# Patient Record
Sex: MALE | Race: WHITE | NOT HISPANIC OR LATINO | Employment: UNEMPLOYED | URBAN - METROPOLITAN AREA
[De-identification: names, ages, dates, MRNs, and addresses within clinical notes are randomized per-mention and may not be internally consistent; named-entity substitution may affect disease eponyms.]

---
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
## 2020-01-09 ENCOUNTER — PATIENT MESSAGE (OUTPATIENT)
Dept: PEDIATRIC ENDOCRINOLOGY | Facility: CLINIC | Age: 20
End: 2020-01-09

## 2020-01-09 DIAGNOSIS — E10.9 TYPE 1 DIABETES MELLITUS WITHOUT COMPLICATIONS: ICD-10-CM

## 2020-01-09 RX ORDER — METFORMIN HYDROCHLORIDE 1000 MG/1
TABLET ORAL
Qty: 60 TABLET | Refills: 0 | Status: SHIPPED | OUTPATIENT
Start: 2020-01-09 | End: 2020-04-21 | Stop reason: SDUPTHER

## 2020-01-29 DIAGNOSIS — E10.9 TYPE 1 DIABETES MELLITUS WITHOUT COMPLICATIONS: ICD-10-CM

## 2020-01-29 RX ORDER — PEN NEEDLE, DIABETIC 30 GX3/16"
NEEDLE, DISPOSABLE MISCELLANEOUS
Qty: 200 EACH | Refills: 3 | Status: SHIPPED | OUTPATIENT
Start: 2020-01-29 | End: 2020-04-21 | Stop reason: SDUPTHER

## 2020-03-12 DIAGNOSIS — E10.9 TYPE 1 DIABETES MELLITUS WITHOUT COMPLICATIONS: ICD-10-CM

## 2020-03-13 RX ORDER — INSULIN LISPRO 100 [IU]/ML
INJECTION, SOLUTION INTRAVENOUS; SUBCUTANEOUS
Qty: 15 ML | Refills: 0 | Status: SHIPPED | OUTPATIENT
Start: 2020-03-13 | End: 2020-04-14

## 2020-03-16 ENCOUNTER — TELEPHONE (OUTPATIENT)
Dept: PEDIATRIC ENDOCRINOLOGY | Facility: CLINIC | Age: 20
End: 2020-03-16

## 2020-03-16 NOTE — TELEPHONE ENCOUNTER
Returned mom's call stating she is returning the call to Katiana Tanner.  Mom informed Katiana in clinic seeing patients and message will be forwarded.  Mom verbalized understanding.    ----- Message from Beatriz Valdez sent at 3/16/2020  2:26 PM CDT -----  Type:  Needs Medical Advice    Who Called: MOM     Would the patient rather a call back or a response via [facility]? CALL BACK     Best Call Back Number: [phone]    Additional Information: RETURNING MISSED CALL

## 2020-03-17 DIAGNOSIS — E10.9 TYPE 1 DIABETES MELLITUS WITHOUT COMPLICATIONS: ICD-10-CM

## 2020-03-17 RX ORDER — CALCIUM CITRATE/VITAMIN D3 200MG-6.25
TABLET ORAL
Qty: 200 STRIP | Refills: 4 | Status: SHIPPED | OUTPATIENT
Start: 2020-03-17 | End: 2020-08-13

## 2020-03-17 NOTE — TELEPHONE ENCOUNTER
Attempted to call pt to schedule peds endo f/u appt; to no avail.   Left a voice message to return my call directly.    ----- Message from Katiana Tanner NP sent at 3/16/2020  4:36 PM CDT -----  Yes, that's why I called her. He hasn't been seen since August and he needs refills. He needs to be scheduled with one of the physicians though.    ----- Message -----  From: Erin Olivia RN  Sent: 3/16/2020   2:48 PM CDT  To: Katiana Tanner NP    Mom called stating you called her and she was returning her call.  She also stated he is over due for his f/u appt.  Would it be okay to schedule?    Please advise.    Erin CALDERON RN

## 2020-04-14 ENCOUNTER — PATIENT MESSAGE (OUTPATIENT)
Dept: PEDIATRIC ENDOCRINOLOGY | Facility: CLINIC | Age: 20
End: 2020-04-14

## 2020-04-14 DIAGNOSIS — E10.9 TYPE 1 DIABETES MELLITUS WITHOUT COMPLICATIONS: ICD-10-CM

## 2020-04-14 RX ORDER — INSULIN LISPRO 100 [IU]/ML
INJECTION, SOLUTION INTRAVENOUS; SUBCUTANEOUS
Qty: 15 SYRINGE | Refills: 0 | Status: SHIPPED | OUTPATIENT
Start: 2020-04-14 | End: 2020-04-21 | Stop reason: SDUPTHER

## 2020-04-14 NOTE — TELEPHONE ENCOUNTER
Per Katiana Tanner, attempted to call pt to schedule peds endo f/u video appt; to no avail.  Left a voice message to return the call to our office to schedule.    ----- Message from Katiana Tanner NP sent at 4/14/2020  8:47 AM CDT -----  Regarding: follow up  I just received another refill request for Jack's insulin. It looks like we tried to schedule his f/u over a month ago and he's missed 2 appts with [facility] since he was last seen in August. I sent a message through the portal but not sure if he will respond. Please see if one of you can reach him to schedule a video visit with either Dr. Valdez or Dr. Puente. Thanks!

## 2020-04-17 ENCOUNTER — TELEPHONE (OUTPATIENT)
Dept: PEDIATRIC ENDOCRINOLOGY | Facility: CLINIC | Age: 20
End: 2020-04-17

## 2020-04-17 ENCOUNTER — PATIENT MESSAGE (OUTPATIENT)
Dept: PEDIATRIC ENDOCRINOLOGY | Facility: CLINIC | Age: 20
End: 2020-04-17

## 2020-04-17 NOTE — TELEPHONE ENCOUNTER
Called mom regarding glucose logs needed prior to Monday's appt.  Mom stated she will have Jack fax to our office by Monday morning.  Mom verbalized understanding.

## 2020-04-17 NOTE — TELEPHONE ENCOUNTER
Returned mom's call regarding pt's peds endo f/u appt.  Mom stated she was calling on Jack's behalf to schedule his video visit.  Mom scheduled appt for Monday (4/20) at 2p with Dr. Valdez.  Mom verified Jack has an active [employer] account and an iOS or Android cell phone or tablet with a microphone and front-facing camera with wi-fi connection.  Informed to check in 15 min prior to video visit via [employer] to begin the video visit and if any issues to contact our office prior to video visit.  Mom instructed to inform pt his height/weight and glucose levels x 2 weeks will need to be obtained prior to appt.  Mom informed instructions and blank log sheet will be emailed to patient.  Mom verbalized understanding.    ----- Message from Angie Lebron sent at 4/17/2020  2:28 PM CDT -----  Contact: [phone]  Type:  Patient Returning Call    Who CalledMOM  Who Left Message for Patient:The Provider  Does the patient know what this is regarding?:No  Would the patient rather a call back  Best Call Back NumberMOM [phone]  Additional Information: Requesting a call back

## 2020-04-20 ENCOUNTER — OFFICE VISIT (OUTPATIENT)
Dept: PEDIATRIC ENDOCRINOLOGY | Facility: CLINIC | Age: 20
End: 2020-04-20
Payer: MEDICAID

## 2020-04-20 DIAGNOSIS — E10.9 TYPE 1 DIABETES MELLITUS WITHOUT COMPLICATION: Primary | ICD-10-CM

## 2020-04-20 DIAGNOSIS — E10.9 TYPE 1 DIABETES MELLITUS WITHOUT COMPLICATIONS: ICD-10-CM

## 2020-04-20 PROCEDURE — 99499 UNLISTED E&M SERVICE: CPT | Mod: ,,, | Performed by: PEDIATRICS

## 2020-04-20 PROCEDURE — 99499 NO LOS: ICD-10-PCS | Mod: ,,, | Performed by: PEDIATRICS

## 2020-04-20 NOTE — TELEPHONE ENCOUNTER
"Called mom to confirm pt's appt.  Mom verbalized"I am at work I will call him to sign on My Chart."  Provider notified.  "

## 2020-04-20 NOTE — PROGRESS NOTES
Patient was rescheduled to tomorrow due to having no BG data available for review. Billable visit was not completed.    Tk Valdez MD  Section of Endocrinology  [facility]

## 2020-04-21 ENCOUNTER — PATIENT MESSAGE (OUTPATIENT)
Dept: PEDIATRIC ENDOCRINOLOGY | Facility: CLINIC | Age: 20
End: 2020-04-21

## 2020-04-21 ENCOUNTER — OFFICE VISIT (OUTPATIENT)
Dept: PEDIATRIC ENDOCRINOLOGY | Facility: CLINIC | Age: 20
End: 2020-04-21
Payer: MEDICAID

## 2020-04-21 DIAGNOSIS — K76.0 FATTY LIVER DISEASE, NONALCOHOLIC: ICD-10-CM

## 2020-04-21 DIAGNOSIS — E10.9 TYPE 1 DIABETES MELLITUS WITHOUT COMPLICATION: Primary | ICD-10-CM

## 2020-04-21 DIAGNOSIS — E06.3 AUTOIMMUNE HYPOTHYROIDISM: ICD-10-CM

## 2020-04-21 DIAGNOSIS — E66.09 OBESITY DUE TO EXCESS CALORIES WITH SERIOUS COMORBIDITY, UNSPECIFIED CLASSIFICATION: ICD-10-CM

## 2020-04-21 DIAGNOSIS — E03.9 HYPOTHYROIDISM (ACQUIRED): ICD-10-CM

## 2020-04-21 DIAGNOSIS — E10.9 NEW ONSET OF DIABETES MELLITUS IN PEDIATRIC PATIENT: ICD-10-CM

## 2020-04-21 DIAGNOSIS — E10.9 TYPE 1 DIABETES MELLITUS WITHOUT COMPLICATIONS: ICD-10-CM

## 2020-04-21 PROCEDURE — 99214 PR OFFICE/OUTPT VISIT, EST, LEVL IV, 30-39 MIN: ICD-10-PCS | Mod: 95,,, | Performed by: PEDIATRICS

## 2020-04-21 PROCEDURE — 99214 OFFICE O/P EST MOD 30 MIN: CPT | Mod: 95,,, | Performed by: PEDIATRICS

## 2020-04-21 RX ORDER — ISOPROPYL ALCOHOL 70 ML/100ML
1 SWAB TOPICAL
Qty: 200 EACH | Refills: 2 | Status: SHIPPED | OUTPATIENT
Start: 2020-04-21

## 2020-04-21 RX ORDER — INSULIN DEGLUDEC 100 U/ML
INJECTION, SOLUTION SUBCUTANEOUS
Qty: 15 SYRINGE | Refills: 0 | Status: SHIPPED | OUTPATIENT
Start: 2020-04-21 | End: 2020-04-21 | Stop reason: SDUPTHER

## 2020-04-21 RX ORDER — INSULIN LISPRO 100 [IU]/ML
INJECTION, SOLUTION INTRAVENOUS; SUBCUTANEOUS
Qty: 9 ML | Refills: 4 | Status: SHIPPED | OUTPATIENT
Start: 2020-04-21 | End: 2021-02-02 | Stop reason: SDUPTHER

## 2020-04-21 RX ORDER — PEN NEEDLE, DIABETIC 30 GX3/16"
NEEDLE, DISPOSABLE MISCELLANEOUS
Qty: 200 EACH | Refills: 3 | Status: SHIPPED | OUTPATIENT
Start: 2020-04-21 | End: 2020-10-15

## 2020-04-21 RX ORDER — METFORMIN HYDROCHLORIDE 1000 MG/1
1000 TABLET ORAL 2 TIMES DAILY WITH MEALS
Qty: 60 TABLET | Refills: 4 | Status: SHIPPED | OUTPATIENT
Start: 2020-04-21 | End: 2020-11-10

## 2020-04-21 RX ORDER — INSULIN DEGLUDEC 100 U/ML
INJECTION, SOLUTION SUBCUTANEOUS
Qty: 12 ML | Refills: 4 | Status: SHIPPED | OUTPATIENT
Start: 2020-04-21 | End: 2021-02-02 | Stop reason: SDUPTHER

## 2020-04-21 RX ORDER — LEVOTHYROXINE SODIUM 125 UG/1
62.5 TABLET ORAL DAILY
Qty: 15 TABLET | Refills: 4 | Status: SHIPPED | OUTPATIENT
Start: 2020-04-21 | End: 2020-10-05

## 2020-04-21 RX ORDER — BLOOD-GLUCOSE CONTROL, NORMAL
EACH MISCELLANEOUS
Qty: 250 EACH | Refills: 3 | Status: SHIPPED | OUTPATIENT
Start: 2020-04-21

## 2020-04-21 NOTE — PROGRESS NOTES
The patient location is: [place]  The chief complaint leading to consultation is: Follow-up diabetes  Visit type: audiovisual  Total time spent with patient: 20 minutes  Each patient to whom he or she provides medical services by telemedicine is:  (1) informed of the relationship between the physician and patient and the respective role of any other health care provider with respect to management of the patient; and (2) notified that he or she may decline to receive medical services by telemedicine and may withdraw from such care at any time.    Notes:     Farshad Chavez is a 19 y.o. male being seen in the pediatric endocrinology clinic today in follow-up for diabetes mellitus with both type 2 (obesity, normal C-peptide at diagnosis) and type 1 (positive JOSE MANUEL antibody) features. He is unaccompanied to this visit.    Farshad was diagnosed diabetes on 6/12/2018. He presented with symptoms of polyuria, polydipsia, fatigue, and ~36 lb weight loss over about 5 months. He was in DKA with mild acidosis pH of 7.32, large urine ketones, bicarb 17, anion gap 19, BOHB 5.2, and initial HgbA1C of 10.4%. He also had AGA with creatinine as high as 1.7, which decreased to baseline of 0.9 with fluid replacement. Islet Cell antibody, insulin antibody were negative. C-peptide was 1.90 and JOSE MANUEL positive at 0.15. He was subsequently also diagnosed with autoimmune thyroiditis and hypothyroidism. He also has evidence of non-alcoholic fatty liver disease with evidence of hepatitis.    Interval History:   Farshad was last seen in diabetes clinic 8 months ago in 8/2019 by VARINDER Tanner. At that time A1c had increased from 6.4% in January 2019 to 9.6% in August. He is currently on a MDI insulin regimen with Tresiba and Humalog, as well as metformin. He was approved for and received a Dexcom in the past, but never took it out of the box because he did not want to wear a device. Takes metformin 1000 mg BID with reported good adherence and no GI  "side effects. Takes Tresiba every morning with no missed doses. Takes about 5-6 units of Humalog with lunch and 7-8 units with dinner. Never eating more than 100g carbs with a meal. Exercising about an hour a day playing basketball and football, reports weight today 260lb compared to 302lb last visit. States he "feels great". Working as a food  now and enjoys the job. No longer working at [employer]. Does not currently have a PCP or adult diabetes provider identified. No DKA, hospitalization or major illnesses since last visit. Denies low BG or high BG. Testing 4x per day as noted below. BG range  mg/dL. Has not been on his levothyroxine for about a month due to running out of refills, but denies fatigue, dry skin, cold intolerance or other symptoms of hypothyroidism.          Current insulin regimen:  Tresiba 32 units daily     Carb Ratio: 1:10 g       Correction Factor: 1 unit for every 20 over 120 during the day and 150 at night    Total daily dose:   ~46 units/day, 70% basal, 0.39 u/kg/day TDD    Review of Systems:  Review of Systems   Constitutional: Negative.    HENT: Negative.    Eyes: Negative.    Respiratory: Negative.    Cardiovascular: Negative.    Gastrointestinal: Negative.    Endocrine: Negative.    Genitourinary: Negative.    Musculoskeletal: Negative.    Skin: Negative.    Allergic/Immunologic: Negative.    Neurological: Negative.    Hematological: Negative.    Psychiatric/Behavioral: Negative.      Past Medical/Family/Surgical/Social History:  I have reviewed, and verified the past medical, surgical, and family history and updated as appropriate.    Meds:  Reviewed and reconciled.     Physical Exam:  There were no vitals taken for this visit.   General: alert, active, in no acute distress  Eyes:  Conjunctivae are normal  Neck: No thyromegaly by observation  Lungs: Effort normal  Heart: Appears well perfused  Neuro: gross motor exam normal by observation    Last Labs:  Results for " IMMANUEL MOJICA (MRN [id number]) as of 4/21/2020 14:48   Ref. Range 8/8/2019 16:17   Hemoglobin A1C External Latest Ref Range: 4.0 - 5.6 % 9.6 (H)   Estimated Avg Glucose Latest Ref Range: 68 - 131 mg/dL 229 (H)   Results for IMMANUEL MOJICA (MRN [id number]) as of 4/21/2020 14:48   Ref. Range 8/8/2019 16:17   TSH Latest Ref Range: 0.400 - 4.000 uIU/mL 5.608 (H)   Free T4 Latest Ref Range: 0.71 - 1.51 ng/dL 0.97   Results for IMMANUEL MOJICA (MRN [id number]) as of 4/21/2020 14:48   Ref. Range 8/8/2019 16:17   Cholesterol Latest Ref Range: 120 - 199 mg/dL 125   HDL Latest Ref Range: 40 - 75 mg/dL 34 (L)   Hdl/Cholesterol Ratio Latest Ref Range: 20.0 - 50.0 % 27.2   LDL Cholesterol External Latest Ref Range: 63.0 - 159.0 mg/dL 72.4   Non-HDL Cholesterol Latest Units: mg/dL 91   Total Cholesterol/HDL Ratio Latest Ref Range: 2.0 - 5.0  3.7   Triglycerides Latest Ref Range: 30 - 150 mg/dL 93   Results for IMMANUEL MOJICA (MRN [id number]) as of 4/21/2020 14:48   Ref. Range 6/12/2018 19:45 7/5/2018 16:16 10/31/2018 10:50 8/8/2019 16:17   AST Latest Ref Range: 10 - 40 U/L 33 41 (H) 19 40   ALT Latest Ref Range: 10 - 44 U/L 85 (H) 98 (H) 39 74 (H)   Results for IMMANUEL MOJICA (MRN [id number]) as of 4/21/2020 14:48   Ref. Range 8/8/2019 16:12   Microalbum.,U,Random Latest Units: ug/mL 14.0   Creatinine, Random Ur Latest Ref Range: 23.0 - 375.0 mg/dL 206.0   MICROALB/CREAT RATIO Latest Ref Range: 0.0 - 30.0 ug/mg 6.8     Eye Exam: Needs baseline eye exam    Assessment/Plan:  Farshad is a 19 y.o. male with T1D based on autoimmunity (but also with features of type 2 diabetes) of approximately 2 years' duration on ~0.39 units/kg/day of MDI insulin plus metformin. He also has autoimmune hypothyroidism and evidence of non-alcoholic steatohepatitis secondary to obesity. If his reported glucoses are true (no meter download or A1c to provide evidence for or against readings), it seems that his diabetes is in excellent control on his current regimen  which is likely secondary to combination of adherence to prescribed regimen and weight loss through healthy lifestyle modifications. I do think it would be reasonable to try eliminating his bolus insulin with meals in favor of trying basal insulin only +/- GLP-1 agonist given evidence of normal C-peptide at diagnosis, but Jack is comfortable with his current regimen so we did not make any changes today. As he is out of levothyroxine he will need to restart on his 62.5 mcg daily and check TFTs along with screening labs in 1 month as he is almost certainly subclinically hypothyroid now. Finally, regarding his consistently elevated ALTs, he may need to see an adult GI provider in the future but for now can monitor with observation of changes as he loses weight. Finally, we discussed transition of care to adult provider and will likely make the referral at next visit. We did not discuss his ongoing need for baseline dilated eye exam today, but he has been referred to ophthalmology at past visits.    Education: referred to Diabetes Educator, interpretation of lab results, blood sugar goals, exercise, self-monitoring of blood glucose skills, nutrition and insulin adjustments     Screening tests due: A1c, TSH, free T4, hepatic profile, TTG IgA    Follow up in 1-2 weeks with CDE by phone.  Follow up in 3 months with Dr. Valdez     It was a pleasure seeing your patient in our clinic today. Thank you for allowing us to participate in his care.      Tk Valdez MD  Section of Endocrinology  [facility]

## 2020-07-20 NOTE — PROGRESS NOTES
"  Farshad Chavez is a 20 y.o. male being seen in the pediatric endocrinology clinic today in follow-up for diabetes mellitus with both type 2 (obesity, normal C-peptide at diagnosis) and type 1 (positive JOSE MANUEL antibody) features. He is unaccompanied to this visit. He was last seen 3 months ago by virtual visit.    Farshad was diagnosed diabetes on 6/12/2018. He presented with symptoms of polyuria, polydipsia, fatigue, and ~36 lb weight loss over about 5 months. He was in DKA with mild acidosis pH of 7.32, large urine ketones, bicarb 17, anion gap 19, BOHB 5.2, and initial HgbA1C of 10.4%. He also had AGA with creatinine as high as 1.7, which decreased to baseline of 0.9 with fluid replacement. Islet Cell antibody, insulin antibody were negative. C-peptide was 1.90 and JOSE MANUEL positive at 0.15. He was subsequently also diagnosed with autoimmune thyroiditis and hypothyroidism. He also has non-alcoholic fatty liver disease with evidence of hepatitis.    Interval History:   Farshad was last seen in diabetes clinic 3 months ago by virtual visit. He has not had an A1c checked in nearly a year. He is currently on a MDI insulin regimen with Tresiba and Humalog, as well as metformin. Injection sites include abdomen and thighs. He did not bring a meter today because "I didn't know I was supposed to bring it". He was approved for and received a Dexcom in the past, but never took it out of the box because he did not want to wear a device. Takes metformin 1000 mg BID with reported good adherence and no GI side effects. Has both 500 mg tabs and 1000 mg tabs at home, so is careful to only take 1 of the 1000 or 2 of the 500 at a time. Takes Tresiba every morning with no reported missed doses. No reported low BGs. Usually skips breakfast, eats lunch and dinner. Takes about 5-6 units of Humalog with lunch and 7-8 units with dinner. Has lost 36lb over the last year which he attributes to not eating out and playing basketball. 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 Reports he tests his BG 4-8x per day. Reports taking his levothyroxine compliantly. He lives at home with his mom and younger brother.    Current insulin regimen:  Tresiba 32 units daily     Carb Ratio: 1:10 g       Correction Factor: 1 unit for every 20 over 120 during the day and 150 at night    Total daily dose:  ~46 units/day, 70% basal, 0.39 u/kg/day TDD    Review of Systems:  Review of Systems   Constitutional: Negative.    HENT: Negative.    Eyes: Negative.    Respiratory: Negative.    Cardiovascular: Negative.    Gastrointestinal: Negative.    Endocrine: Negative.    Genitourinary: Negative.    Musculoskeletal: Negative.    Skin: Negative.    Allergic/Immunologic: Negative.    Neurological: Negative.    Hematological: Negative.    Psychiatric/Behavioral: Negative.      Past Medical/Family/Surgical/Social History:  I have reviewed, and verified the past medical, surgical, family, and social history and updated as appropriate.    Meds:  Reviewed and reconciled.     Physical Exam:  /81   Pulse 90   Ht 5' 11.26" (1.81 m)   Wt 120.8 kg (266 lb 6.8 oz)   BMI 36.89 kg/m²      General: alert, active, in no acute distress  Skin: No lipohypertrophy of injection sites  Eyes:  Conjunctivae are normal  Neck: No thyromegaly   Lungs: Effort normal  Heart: Warm, well perfused  Neuro: gross motor exam normal by observation  Foot Exam: Warm, normal DP pulses, no ulcers or other lesions, normal sensation to bottom of feet bilaterally    Last Labs:  Results for IMMANUEL MOJICA (MRN [id number]) as of 4/21/2020 14:48   Ref. Range 8/8/2019 16:17   Hemoglobin A1C External Latest Ref Range: 4.0 - 5.6 % 9.6 (H)   Estimated Avg Glucose Latest Ref Range: 68 - 131 mg/dL 229 (H)   Results for IMMANUEL MOJICA (MRN [id number]) as of 4/21/2020 14:48   Ref. Range 8/8/2019 " 16:17   TSH Latest Ref Range: 0.400 - 4.000 uIU/mL 5.608 (H)   Free T4 Latest Ref Range: 0.71 - 1.51 ng/dL 0.97   Results for IMMANUEL MOJICA (MRN [id number]) as of 4/21/2020 14:48   Ref. Range 8/8/2019 16:17   Cholesterol Latest Ref Range: 120 - 199 mg/dL 125   HDL Latest Ref Range: 40 - 75 mg/dL 34 (L)   Hdl/Cholesterol Ratio Latest Ref Range: 20.0 - 50.0 % 27.2   LDL Cholesterol External Latest Ref Range: 63.0 - 159.0 mg/dL 72.4   Non-HDL Cholesterol Latest Units: mg/dL 91   Total Cholesterol/HDL Ratio Latest Ref Range: 2.0 - 5.0  3.7   Triglycerides Latest Ref Range: 30 - 150 mg/dL 93   Results for IMMANUEL MOJICA (MRN [id number]) as of 4/21/2020 14:48   Ref. Range 6/12/2018 19:45 7/5/2018 16:16 10/31/2018 10:50 8/8/2019 16:17   AST Latest Ref Range: 10 - 40 U/L 33 41 (H) 19 40   ALT Latest Ref Range: 10 - 44 U/L 85 (H) 98 (H) 39 74 (H)   Results for IMMANUEL MOJICA (MRN [id number]) as of 4/21/2020 14:48   Ref. Range 8/8/2019 16:12   Microalbum.,U,Random Latest Units: ug/mL 14.0   Creatinine, Random Ur Latest Ref Range: 23.0 - 375.0 mg/dL 206.0   MICROALB/CREAT RATIO Latest Ref Range: 0.0 - 30.0 ug/mg 6.8     Eye Exam: Due by 3-5 years after diagnosis - Summer 2021    Assessment/Plan:  Farshad is a 20 y.o. male with T1D based on autoimmunity (but also with features of insulin resistance and type 2 diabetes) of approximately 2 years' duration on relatively low (~0.4 units/kg/day) doses of MDI insulin plus metformin. He also has autoimmune hypothyroidism and evidence of non-alcoholic steatohepatitis secondary to obesity. Unfortunately he did not bring his glucometer today for review, so I was unable to make any insulin dose adjustment recommendations. I did ask him to send me a log of the last week of BGs today via Qinti for review. I do think it is time for him to transition to adult care now that he is 20 years-old, and [facility] on [address] would be the most convenient location in relation to his home. I  will make a referral. We will obtain routine screening labs today and I did add a c-peptide as though he is JOSE MANUEL positive and has another autoimmune condition (type 1 features), his low insulin requirements and stability of BGs in the past do suggest some possible endogenous insulin production and potential benefit from other therapeutics such as GLP-1 agonists. Will leave this for discussion with adult endocrinologist.     Given his significant weight loss, I do hope his ALT is improved today but if there is chronic liver inflammation from non-alcoholic fatty liver he may need to see adult GI.     I will also titrate his levothyroxine as needed based on thyroid function tests today. Continue 62.5 mcg daily for now.     Of note, BP has been elevated into hypertension range the last 2 visits. BP was taken by machine rather than manually today and not repeated after the visit. Will need to follow-up at adult endocrinology to see if it remains elevated and consider antihypertensive if so.     Screening tests due: A1c, TSH, free T4, hepatic profile, TTG IgA, c-peptide, urine microalbumin/creatinine    Follow-up 3 months at [facility] [place] to transition to adult care.     Jack expressed agreement and understanding with the plan as outlined above.    It was a pleasure seeing your patient in our clinic today. Thank you for allowing us to participate in his care.      Tk Valdez MD  Section of Endocrinology  [facility]

## 2020-07-22 ENCOUNTER — LAB VISIT (OUTPATIENT)
Dept: LAB | Facility: HOSPITAL | Age: 20
End: 2020-07-22
Attending: PEDIATRICS
Payer: MEDICAID

## 2020-07-22 ENCOUNTER — OFFICE VISIT (OUTPATIENT)
Dept: PEDIATRIC ENDOCRINOLOGY | Facility: CLINIC | Age: 20
End: 2020-07-22
Payer: MEDICAID

## 2020-07-22 VITALS
SYSTOLIC BLOOD PRESSURE: 139 MMHG | HEART RATE: 90 BPM | DIASTOLIC BLOOD PRESSURE: 81 MMHG | BODY MASS INDEX: 37.3 KG/M2 | WEIGHT: 266.44 LBS | HEIGHT: 71 IN

## 2020-07-22 DIAGNOSIS — E10.9 TYPE 1 DIABETES MELLITUS WITHOUT COMPLICATION: ICD-10-CM

## 2020-07-22 DIAGNOSIS — E10.65 TYPE 1 DIABETES MELLITUS WITH HYPERGLYCEMIA: Primary | ICD-10-CM

## 2020-07-22 DIAGNOSIS — Z71.87 COUNSELING FOR TRANSITION FROM PEDIATRIC TO ADULT CARE PROVIDER: ICD-10-CM

## 2020-07-22 DIAGNOSIS — E10.65 TYPE 1 DIABETES MELLITUS WITH HYPERGLYCEMIA: ICD-10-CM

## 2020-07-22 DIAGNOSIS — E06.3 AUTOIMMUNE HYPOTHYROIDISM: ICD-10-CM

## 2020-07-22 DIAGNOSIS — K76.0 FATTY LIVER DISEASE, NONALCOHOLIC: ICD-10-CM

## 2020-07-22 DIAGNOSIS — E66.01 CLASS 2 SEVERE OBESITY DUE TO EXCESS CALORIES WITH SERIOUS COMORBIDITY AND BODY MASS INDEX (BMI) OF 36.0 TO 36.9 IN ADULT: ICD-10-CM

## 2020-07-22 LAB
ALBUMIN SERPL BCP-MCNC: 4.7 G/DL (ref 3.5–5.2)
ALP SERPL-CCNC: 72 U/L (ref 55–135)
ALT SERPL W/O P-5'-P-CCNC: 19 U/L (ref 10–44)
AST SERPL-CCNC: 15 U/L (ref 10–40)
BILIRUB DIRECT SERPL-MCNC: 0.4 MG/DL (ref 0.1–0.3)
BILIRUB SERPL-MCNC: 1 MG/DL (ref 0.1–1)
PROT SERPL-MCNC: 7.6 G/DL (ref 6–8.4)
T4 FREE SERPL-MCNC: 1.28 NG/DL (ref 0.71–1.51)
TSH SERPL DL<=0.005 MIU/L-ACNC: 2.1 UIU/ML (ref 0.4–4)

## 2020-07-22 PROCEDURE — 99214 OFFICE O/P EST MOD 30 MIN: CPT | Mod: S$PBB,,, | Performed by: PEDIATRICS

## 2020-07-22 PROCEDURE — 83516 IMMUNOASSAY NONANTIBODY: CPT

## 2020-07-22 PROCEDURE — 84443 ASSAY THYROID STIM HORMONE: CPT

## 2020-07-22 PROCEDURE — 36415 COLL VENOUS BLD VENIPUNCTURE: CPT

## 2020-07-22 PROCEDURE — 99214 PR OFFICE/OUTPT VISIT, EST, LEVL IV, 30-39 MIN: ICD-10-PCS | Mod: S$PBB,,, | Performed by: PEDIATRICS

## 2020-07-22 PROCEDURE — 83036 HEMOGLOBIN GLYCOSYLATED A1C: CPT

## 2020-07-22 PROCEDURE — 99215 OFFICE O/P EST HI 40 MIN: CPT | Mod: PBBFAC | Performed by: PEDIATRICS

## 2020-07-22 PROCEDURE — 80076 HEPATIC FUNCTION PANEL: CPT

## 2020-07-22 PROCEDURE — 99999 PR PBB SHADOW E&M-EST. PATIENT-LVL V: ICD-10-PCS | Mod: PBBFAC,,, | Performed by: PEDIATRICS

## 2020-07-22 PROCEDURE — 84681 ASSAY OF C-PEPTIDE: CPT

## 2020-07-22 PROCEDURE — 84439 ASSAY OF FREE THYROXINE: CPT

## 2020-07-22 PROCEDURE — 99999 PR PBB SHADOW E&M-EST. PATIENT-LVL V: CPT | Mod: PBBFAC,,, | Performed by: PEDIATRICS

## 2020-07-22 NOTE — PATIENT INSTRUCTIONS
-Labs today I will call you with results  -Please send me a 1 week log of blood sugars when you get home  -Follow-up in 3 months with [facility] to establish your adult diabetes care:  Address: [address] [place] [id number]  Hours: 8a.m. - 5:00 p.m., Monday - Friday  [facility] Phone: [phone]

## 2020-07-22 NOTE — Clinical Note
Please send referral and records to [facility] Endocr[facility] clinic for Jack. Referral order placed. Thank you.

## 2020-07-23 LAB
C PEPTIDE SERPL-MCNC: 0.83 NG/ML (ref 0.78–5.19)
ESTIMATED AVG GLUCOSE: 278 MG/DL (ref 68–131)
HBA1C MFR BLD HPLC: 11.3 % (ref 4–5.6)

## 2020-07-27 LAB — TTG IGA SER-ACNC: 5 UNITS

## 2020-07-27 NOTE — PROGRESS NOTES
Result Note    Immanuel's labs show normalized liver enzymes, normal thyroid function (will continue 62.5 mcg levothyroxine once daily), and negative celiac screening. A1c is increased significantly from 9.6% last year to 11.3% now. Informed Immanuel of results via phone and he was surprised to hear of A1c result. Advised to send me 1-2 weeks worth of home BGs from glucometer so dosing changes can be recommended. He expressed agreement and understanding.    Results for IMMANUEL MOJICA (MRN [id number]) as of 7/27/2020 16:02   Ref. Range 7/22/2020 14:57   Alkaline Phosphatase Latest Ref Range: 55 - 135 U/L 72   PROTEIN TOTAL Latest Ref Range: 6.0 - 8.4 g/dL 7.6   Albumin Latest Ref Range: 3.5 - 5.2 g/dL 4.7   BILIRUBIN TOTAL Latest Ref Range: 0.1 - 1.0 mg/dL 1.0   Bilirubin, Direct Latest Ref Range: 0.1 - 0.3 mg/dL 0.4 (H)   AST Latest Ref Range: 10 - 40 U/L 15   ALT Latest Ref Range: 10 - 44 U/L 19   Hemoglobin A1C External Latest Ref Range: 4.0 - 5.6 % 11.3 (H)   Estimated Avg Glucose Latest Ref Range: 68 - 131 mg/dL 278 (H)   C-Peptide Latest Ref Range: 0.78 - 5.19 ng/mL 0.83   TSH Latest Ref Range: 0.400 - 4.000 uIU/mL 2.095   Free T4 Latest Ref Range: 0.71 - 1.51 ng/dL 1.28   TTG IgA Latest Ref Range: <20 UNITS 5     Tk Valdez MD  Section of Endocrinology  [facility]

## 2020-07-28 RX ORDER — DEXTROSE 4 G
TABLET,CHEWABLE ORAL
Qty: 1 EACH | Refills: 1 | Status: SHIPPED | OUTPATIENT
Start: 2020-07-28 | End: 2020-12-17 | Stop reason: SDUPTHER

## 2020-12-17 ENCOUNTER — PATIENT MESSAGE (OUTPATIENT)
Dept: PEDIATRIC ENDOCRINOLOGY | Facility: CLINIC | Age: 20
End: 2020-12-17

## 2020-12-17 DIAGNOSIS — E10.65 TYPE 1 DIABETES MELLITUS WITH HYPERGLYCEMIA: Primary | ICD-10-CM

## 2020-12-17 RX ORDER — DEXTROSE 4 G
TABLET,CHEWABLE ORAL
Qty: 1 EACH | Refills: 1 | Status: SHIPPED | OUTPATIENT
Start: 2020-12-17

## 2021-01-07 ENCOUNTER — PATIENT MESSAGE (OUTPATIENT)
Dept: PEDIATRIC ENDOCRINOLOGY | Facility: CLINIC | Age: 21
End: 2021-01-07

## 2021-01-07 ENCOUNTER — TELEPHONE (OUTPATIENT)
Dept: PEDIATRIC ENDOCRINOLOGY | Facility: CLINIC | Age: 21
End: 2021-01-07

## 2021-01-15 ENCOUNTER — TELEPHONE (OUTPATIENT)
Dept: PEDIATRIC ENDOCRINOLOGY | Facility: CLINIC | Age: 21
End: 2021-01-15

## 2021-01-22 ENCOUNTER — PATIENT MESSAGE (OUTPATIENT)
Dept: PEDIATRIC ENDOCRINOLOGY | Facility: CLINIC | Age: 21
End: 2021-01-22

## 2021-02-02 ENCOUNTER — LAB VISIT (OUTPATIENT)
Dept: LAB | Facility: HOSPITAL | Age: 21
End: 2021-02-02
Attending: NURSE PRACTITIONER
Payer: MEDICAID

## 2021-02-02 ENCOUNTER — OFFICE VISIT (OUTPATIENT)
Dept: PEDIATRIC ENDOCRINOLOGY | Facility: CLINIC | Age: 21
End: 2021-02-02
Payer: MEDICAID

## 2021-02-02 VITALS
DIASTOLIC BLOOD PRESSURE: 69 MMHG | WEIGHT: 258.5 LBS | BODY MASS INDEX: 36.19 KG/M2 | SYSTOLIC BLOOD PRESSURE: 147 MMHG | HEIGHT: 71 IN | HEART RATE: 83 BPM

## 2021-02-02 DIAGNOSIS — E10.9 TYPE 1 DIABETES MELLITUS WITHOUT COMPLICATIONS: ICD-10-CM

## 2021-02-02 DIAGNOSIS — E06.3 AUTOIMMUNE HYPOTHYROIDISM: ICD-10-CM

## 2021-02-02 DIAGNOSIS — Z91.199 NONCOMPLIANCE WITH SELF-MONITORING REGIMEN: ICD-10-CM

## 2021-02-02 DIAGNOSIS — E66.01 CLASS 2 SEVERE OBESITY DUE TO EXCESS CALORIES WITH SERIOUS COMORBIDITY AND BODY MASS INDEX (BMI) OF 36.0 TO 36.9 IN ADULT: ICD-10-CM

## 2021-02-02 DIAGNOSIS — E10.65 TYPE 1 DIABETES MELLITUS WITH HYPERGLYCEMIA: Primary | ICD-10-CM

## 2021-02-02 DIAGNOSIS — E10.65 TYPE 1 DIABETES MELLITUS WITH HYPERGLYCEMIA: ICD-10-CM

## 2021-02-02 LAB
ESTIMATED AVG GLUCOSE: 301 MG/DL (ref 68–131)
HBA1C MFR BLD: 12.1 % (ref 4–5.6)
T4 FREE SERPL-MCNC: 1.12 NG/DL (ref 0.71–1.51)
TSH SERPL DL<=0.005 MIU/L-ACNC: 2.62 UIU/ML (ref 0.4–4)

## 2021-02-02 PROCEDURE — 99213 OFFICE O/P EST LOW 20 MIN: CPT | Mod: PBBFAC | Performed by: NURSE PRACTITIONER

## 2021-02-02 PROCEDURE — 99215 PR OFFICE/OUTPT VISIT, EST, LEVL V, 40-54 MIN: ICD-10-PCS | Mod: S$PBB,,, | Performed by: NURSE PRACTITIONER

## 2021-02-02 PROCEDURE — 83036 HEMOGLOBIN GLYCOSYLATED A1C: CPT

## 2021-02-02 PROCEDURE — 99999 PR PBB SHADOW E&M-EST. PATIENT-LVL III: CPT | Mod: PBBFAC,,, | Performed by: NURSE PRACTITIONER

## 2021-02-02 PROCEDURE — 36415 COLL VENOUS BLD VENIPUNCTURE: CPT

## 2021-02-02 PROCEDURE — 99999 PR PBB SHADOW E&M-EST. PATIENT-LVL III: ICD-10-PCS | Mod: PBBFAC,,, | Performed by: NURSE PRACTITIONER

## 2021-02-02 PROCEDURE — 84439 ASSAY OF FREE THYROXINE: CPT

## 2021-02-02 PROCEDURE — 99215 OFFICE O/P EST HI 40 MIN: CPT | Mod: S$PBB,,, | Performed by: NURSE PRACTITIONER

## 2021-02-02 PROCEDURE — 84443 ASSAY THYROID STIM HORMONE: CPT

## 2021-02-02 RX ORDER — INSULIN DEGLUDEC 100 U/ML
INJECTION, SOLUTION SUBCUTANEOUS
Qty: 12 ML | Refills: 4 | Status: SHIPPED | OUTPATIENT
Start: 2021-02-02

## 2021-02-02 RX ORDER — METFORMIN HYDROCHLORIDE 1000 MG/1
1000 TABLET ORAL 2 TIMES DAILY WITH MEALS
Qty: 60 TABLET | Refills: 2 | Status: SHIPPED | OUTPATIENT
Start: 2021-02-02 | End: 2021-05-25

## 2021-02-02 RX ORDER — GLUCAGON 3 MG/1
3 POWDER NASAL
Qty: 2 EACH | Refills: 0 | Status: SHIPPED | OUTPATIENT
Start: 2021-02-02

## 2021-02-02 RX ORDER — INSULIN LISPRO 100 [IU]/ML
INJECTION, SOLUTION INTRAVENOUS; SUBCUTANEOUS
Qty: 9 ML | Refills: 4 | Status: SHIPPED | OUTPATIENT
Start: 2021-02-02 | End: 2021-07-09

## 2021-02-04 ENCOUNTER — PATIENT MESSAGE (OUTPATIENT)
Dept: PEDIATRIC ENDOCRINOLOGY | Facility: CLINIC | Age: 21
End: 2021-02-04

## 2021-04-16 ENCOUNTER — PATIENT MESSAGE (OUTPATIENT)
Dept: RESEARCH | Facility: HOSPITAL | Age: 21
End: 2021-04-16

## 2021-05-12 ENCOUNTER — PATIENT MESSAGE (OUTPATIENT)
Dept: PEDIATRIC ENDOCRINOLOGY | Facility: CLINIC | Age: 21
End: 2021-05-12

## 2021-06-18 ENCOUNTER — TELEPHONE (OUTPATIENT)
Dept: PEDIATRIC ENDOCRINOLOGY | Facility: CLINIC | Age: 21
End: 2021-06-18

## 2021-06-21 ENCOUNTER — TELEPHONE (OUTPATIENT)
Dept: PEDIATRIC ENDOCRINOLOGY | Facility: CLINIC | Age: 21
End: 2021-06-21

## 2021-06-24 ENCOUNTER — TELEPHONE (OUTPATIENT)
Dept: PEDIATRIC ENDOCRINOLOGY | Facility: CLINIC | Age: 21
End: 2021-06-24

## 2021-07-07 ENCOUNTER — TELEPHONE (OUTPATIENT)
Dept: PEDIATRIC ENDOCRINOLOGY | Facility: CLINIC | Age: 21
End: 2021-07-07

## 2021-07-09 DIAGNOSIS — E10.9 TYPE 1 DIABETES MELLITUS WITHOUT COMPLICATIONS: ICD-10-CM

## 2021-07-09 RX ORDER — INSULIN LISPRO 100 [IU]/ML
INJECTION, SOLUTION INTRAVENOUS; SUBCUTANEOUS
Qty: 9 ML | Refills: 0 | Status: SHIPPED | OUTPATIENT
Start: 2021-07-09 | End: 2021-07-16 | Stop reason: SDUPTHER

## 2021-07-16 ENCOUNTER — TELEPHONE (OUTPATIENT)
Dept: PEDIATRIC ENDOCRINOLOGY | Facility: CLINIC | Age: 21
End: 2021-07-16

## 2021-07-16 ENCOUNTER — OFFICE VISIT (OUTPATIENT)
Dept: PEDIATRIC ENDOCRINOLOGY | Facility: CLINIC | Age: 21
End: 2021-07-16
Payer: MEDICAID

## 2021-07-16 ENCOUNTER — LAB VISIT (OUTPATIENT)
Dept: LAB | Facility: HOSPITAL | Age: 21
End: 2021-07-16
Payer: MEDICAID

## 2021-07-16 VITALS
BODY MASS INDEX: 36.9 KG/M2 | WEIGHT: 263.56 LBS | HEART RATE: 59 BPM | DIASTOLIC BLOOD PRESSURE: 71 MMHG | HEIGHT: 71 IN | SYSTOLIC BLOOD PRESSURE: 123 MMHG | OXYGEN SATURATION: 100 %

## 2021-07-16 DIAGNOSIS — E10.65 TYPE 1 DIABETES MELLITUS WITH HYPERGLYCEMIA: ICD-10-CM

## 2021-07-16 DIAGNOSIS — E10.65 TYPE 1 DIABETES MELLITUS WITH HYPERGLYCEMIA: Primary | ICD-10-CM

## 2021-07-16 DIAGNOSIS — E06.3 AUTOIMMUNE HYPOTHYROIDISM: ICD-10-CM

## 2021-07-16 DIAGNOSIS — E10.9 TYPE 1 DIABETES MELLITUS WITHOUT COMPLICATIONS: ICD-10-CM

## 2021-07-16 DIAGNOSIS — K76.0 FATTY LIVER DISEASE, NONALCOHOLIC: ICD-10-CM

## 2021-07-16 DIAGNOSIS — E03.9 HYPOTHYROIDISM (ACQUIRED): ICD-10-CM

## 2021-07-16 LAB
ALBUMIN SERPL BCP-MCNC: 4.2 G/DL (ref 3.5–5.2)
ALP SERPL-CCNC: 62 U/L (ref 55–135)
ALT SERPL W/O P-5'-P-CCNC: 12 U/L (ref 10–44)
ANION GAP SERPL CALC-SCNC: 14 MMOL/L (ref 8–16)
AST SERPL-CCNC: 12 U/L (ref 10–40)
BILIRUB SERPL-MCNC: 0.7 MG/DL (ref 0.1–1)
BUN SERPL-MCNC: 10 MG/DL (ref 6–20)
CALCIUM SERPL-MCNC: 9.7 MG/DL (ref 8.7–10.5)
CHLORIDE SERPL-SCNC: 102 MMOL/L (ref 95–110)
CHOLEST SERPL-MCNC: 155 MG/DL (ref 120–199)
CHOLEST/HDLC SERPL: 4.1 {RATIO} (ref 2–5)
CO2 SERPL-SCNC: 24 MMOL/L (ref 23–29)
CREAT SERPL-MCNC: 0.9 MG/DL (ref 0.5–1.4)
EST. GFR  (AFRICAN AMERICAN): >60 ML/MIN/1.73 M^2
EST. GFR  (NON AFRICAN AMERICAN): >60 ML/MIN/1.73 M^2
ESTIMATED AVG GLUCOSE: 269 MG/DL (ref 68–131)
GLUCOSE SERPL-MCNC: 166 MG/DL (ref 70–110)
HBA1C MFR BLD: 11 % (ref 4–5.6)
HDLC SERPL-MCNC: 38 MG/DL (ref 40–75)
HDLC SERPL: 24.5 % (ref 20–50)
LDLC SERPL CALC-MCNC: 87 MG/DL (ref 63–159)
NONHDLC SERPL-MCNC: 117 MG/DL
POTASSIUM SERPL-SCNC: 4 MMOL/L (ref 3.5–5.1)
PROT SERPL-MCNC: 6.8 G/DL (ref 6–8.4)
SODIUM SERPL-SCNC: 140 MMOL/L (ref 136–145)
T4 FREE SERPL-MCNC: 1.18 NG/DL (ref 0.71–1.51)
TRIGL SERPL-MCNC: 150 MG/DL (ref 30–150)
TSH SERPL DL<=0.005 MIU/L-ACNC: 5.46 UIU/ML (ref 0.4–4)

## 2021-07-16 PROCEDURE — 99214 OFFICE O/P EST MOD 30 MIN: CPT | Mod: PBBFAC | Performed by: NURSE PRACTITIONER

## 2021-07-16 PROCEDURE — 84443 ASSAY THYROID STIM HORMONE: CPT | Performed by: NURSE PRACTITIONER

## 2021-07-16 PROCEDURE — 80061 LIPID PANEL: CPT | Performed by: NURSE PRACTITIONER

## 2021-07-16 PROCEDURE — 99999 PR PBB SHADOW E&M-EST. PATIENT-LVL IV: CPT | Mod: PBBFAC,,, | Performed by: NURSE PRACTITIONER

## 2021-07-16 PROCEDURE — 99999 PR PBB SHADOW E&M-EST. PATIENT-LVL IV: ICD-10-PCS | Mod: PBBFAC,,, | Performed by: NURSE PRACTITIONER

## 2021-07-16 PROCEDURE — 99215 PR OFFICE/OUTPT VISIT, EST, LEVL V, 40-54 MIN: ICD-10-PCS | Mod: S$PBB,,, | Performed by: NURSE PRACTITIONER

## 2021-07-16 PROCEDURE — 80053 COMPREHEN METABOLIC PANEL: CPT | Performed by: NURSE PRACTITIONER

## 2021-07-16 PROCEDURE — 36415 COLL VENOUS BLD VENIPUNCTURE: CPT | Performed by: NURSE PRACTITIONER

## 2021-07-16 PROCEDURE — 83036 HEMOGLOBIN GLYCOSYLATED A1C: CPT | Performed by: NURSE PRACTITIONER

## 2021-07-16 PROCEDURE — 99215 OFFICE O/P EST HI 40 MIN: CPT | Mod: S$PBB,,, | Performed by: NURSE PRACTITIONER

## 2021-07-16 PROCEDURE — 84439 ASSAY OF FREE THYROXINE: CPT | Performed by: NURSE PRACTITIONER

## 2021-07-16 RX ORDER — LEVOTHYROXINE SODIUM 75 UG/1
75 TABLET ORAL
Qty: 30 TABLET | Refills: 2 | Status: SHIPPED | OUTPATIENT
Start: 2021-07-16

## 2021-07-16 RX ORDER — BLOOD-GLUCOSE METER
KIT MISCELLANEOUS
Qty: 200 EACH | Refills: 2 | Status: SHIPPED | OUTPATIENT
Start: 2021-07-16

## 2021-07-16 RX ORDER — INSULIN LISPRO 100 [IU]/ML
INJECTION, SOLUTION INTRAVENOUS; SUBCUTANEOUS
Qty: 15 ML | Refills: 2 | Status: SHIPPED | OUTPATIENT
Start: 2021-07-16

## 2021-07-16 RX ORDER — FLASH GLUCOSE SENSOR
KIT MISCELLANEOUS
Qty: 2 KIT | Refills: 3 | Status: SHIPPED | OUTPATIENT
Start: 2021-07-16

## 2021-08-17 ENCOUNTER — IMMUNIZATION (OUTPATIENT)
Dept: PRIMARY CARE CLINIC | Facility: CLINIC | Age: 21
End: 2021-08-17
Payer: MEDICAID

## 2021-08-17 DIAGNOSIS — Z23 NEED FOR VACCINATION: Primary | ICD-10-CM

## 2021-08-17 PROCEDURE — 0031A COVID-19,VECTOR-NR,RS-AD26,PF,0.5 ML DOSE VACCINE (JANSSEN): ICD-10-PCS | Mod: CV19,S$GLB,, | Performed by: INTERNAL MEDICINE

## 2021-08-17 PROCEDURE — 91303 COVID-19,VECTOR-NR,RS-AD26,PF,0.5 ML DOSE VACCINE (JANSSEN): ICD-10-PCS | Mod: S$GLB,,, | Performed by: INTERNAL MEDICINE

## 2021-08-17 PROCEDURE — 0031A COVID-19,VECTOR-NR,RS-AD26,PF,0.5 ML DOSE VACCINE (JANSSEN): CPT | Mod: CV19,S$GLB,, | Performed by: INTERNAL MEDICINE

## 2021-08-17 PROCEDURE — 91303 COVID-19,VECTOR-NR,RS-AD26,PF,0.5 ML DOSE VACCINE (JANSSEN): CPT | Mod: S$GLB,,, | Performed by: INTERNAL MEDICINE

## 2022-01-13 ENCOUNTER — TELEPHONE (OUTPATIENT)
Dept: PEDIATRIC ENDOCRINOLOGY | Facility: CLINIC | Age: 22
End: 2022-01-13
Payer: MEDICAID

## 2022-01-13 NOTE — TELEPHONE ENCOUNTER
Returned [facility] call informing pt transferred care to adult endo; requested [facility] call pt for providers info to send rx.    ----- Message from Jenny Rios sent at 1/13/2022 10:14 AM CST -----  Pharmacy is calling to clarify an RX.  RX name:  flash glucose sensor (FREESTYLE KIMBERLY 14 DAY SENSOR) Kit    What do they need to clarify:  PA needed for pt medication     Comments:    [id number] - [place], [place] - [address]   Phone:  [phone]  Fax:  [phone]

## 2023-10-19 ENCOUNTER — DOCUMENTATION ONLY (OUTPATIENT)
Dept: ENDOCRINOLOGY | Facility: CLINIC | Age: 23
End: 2023-10-19
Payer: MEDICAID

## 2024-09-27 ENCOUNTER — PATIENT MESSAGE (OUTPATIENT)
Dept: ENDOCRINOLOGY | Facility: CLINIC | Age: 24
End: 2024-09-27
Payer: MEDICAID

## 2024-09-27 ENCOUNTER — OFFICE VISIT (OUTPATIENT)
Facility: CLINIC | Age: 24
End: 2024-09-27
Payer: MEDICAID

## 2024-09-27 DIAGNOSIS — E10.9 TYPE 1 DIABETES MELLITUS WITHOUT COMPLICATION: Primary | ICD-10-CM

## 2024-09-27 DIAGNOSIS — E06.3 AUTOIMMUNE HYPOTHYROIDISM: ICD-10-CM

## 2024-09-27 PROCEDURE — 1159F MED LIST DOCD IN RCRD: CPT | Mod: CPTII,95,, | Performed by: STUDENT IN AN ORGANIZED HEALTH CARE EDUCATION/TRAINING PROGRAM

## 2024-09-27 PROCEDURE — G2211 COMPLEX E/M VISIT ADD ON: HCPCS | Mod: 95,,, | Performed by: STUDENT IN AN ORGANIZED HEALTH CARE EDUCATION/TRAINING PROGRAM

## 2024-09-27 PROCEDURE — 99204 OFFICE O/P NEW MOD 45 MIN: CPT | Mod: 95,,, | Performed by: STUDENT IN AN ORGANIZED HEALTH CARE EDUCATION/TRAINING PROGRAM

## 2024-09-27 RX ORDER — BLOOD-GLUCOSE SENSOR
1 EACH MISCELLANEOUS
Qty: 3 EACH | Refills: 11 | Status: SHIPPED | OUTPATIENT
Start: 2024-09-27 | End: 2025-09-27

## 2024-09-27 NOTE — Clinical Note
Please arrange labs for this patient on Monday near where he lives.    I have placed diabetes education visit as well which should be set up in 2-3 weeks.  Ideally with our main campus team of [place] or [place] if possible.    He will need a follow up in 3-4 months as well.    Thanks

## 2024-09-27 NOTE — PROGRESS NOTES
ENDOCRINOLOGY NEW PATIENT VISIT: 09/27/2024    The patient location is: Personal Vehicle  The chief complaint leading to consultation is: Diabetes and thyroid    Visit type: audiovisual    Face to Face time with patient: 30  45 minutes of total time spent on the encounter, which includes face to face time and non-face to face time preparing to see the patient (eg, review of tests), Obtaining and/or reviewing separately obtained history, Documenting clinical information in the electronic or other health record, Independently interpreting results (not separately reported) and communicating results to the patient/family/caregiver, or Care coordination (not separately reported).     Each patient to whom he or she provides medical services by telemedicine is:  (1) informed of the relationship between the physician and patient and the respective role of any other health care provider with respect to management of the patient; and (2) notified that he or she may decline to receive medical services by telemedicine and may withdraw from such care at any time.      Subjective:      Patient ID: Farshad Chavez is a 24 y.o. male.    Chief Complaint:  Diabetes    History of Present Illness  Jack Chavez presents to establish care for his diabetes and thyroid problems.  He previously followed with Dr. Gillis through [facility] for endocrine care but now is wanting to establish with [facility].  Had been on a CGM in the past but now only using glucometer with carb ratio for insulin use and correction factor for correcting high glucose levels.  Prior A1c values have been double digits mostly.       Regarding diabetes:    Initially Diagnosed with T1D:  June 2018    Lab Study: Result:   C-Peptid Lab Results   Component Value Date    GLUTAMICACID 0.15 (H) 06/12/2018      JOSE MANUEL Lab Results   Component Value Date    CPEPTIDE 0.83 07/22/2020        Predicted ISF/ICR based on weight: (81 kg)  1700/40 = 42.5 ISF  450/40 = 11.25 ICR      Current  symptoms/problems:   Denies any polyuria, polydipsia, nocturia, nausea vomiting, abdominal discomfort, numbness/tingling, visual change, weight change     Denies h/o frequent UTIs/yeast infections, DKA, ketogenic diet, diuretics   Denies h/o pancreatitis, recurrent gallstones, daily etoh use, MEN syndrome, pancreatic tumors, gastroparesis, diabetic retinopathy    Known diabetic complications: none  Cardiovascular risk factors: diabetes mellitus    Current diabetic medications include:    1.   Tresiba 32 units qHS   2.   Humalog - 10:1 ratio for carbs then ISF of 10 above 170   3.   Metformin 1000 BID    Other medications tried:  Fiasp from a sample pen    Diet: on average, 2 meals per day and some snacks      BF:  Usually skips on workdays    L:  Variable    D:  Variable    Snacks:  Bag of chips, crackers (he will not always)    Exercise: Football 30-60 mins 3 days a week    Works at the [employer] in [place]    Glucose Trends:        Highest BG recently was 280.      BG has been in the 200-210 range    Any episodes of hypoglycemia? Not often.  He recalls having GVoke pen.      Family Hx:     Grandfather - Diabetes              Wt Readings from Last 10 Encounters:   07/16/21 119.6 kg (263 lb 9 oz)   02/02/21 117.3 kg (258 lb 7.8 oz)   07/22/20 120.8 kg (266 lb 6.8 oz)   10/28/19 131 kg (288 lb 12.8 oz)   08/08/19 (!) 137.4 kg (302 lb 14.6 oz)   01/18/19 (!) 143 kg (315 lb 4.1 oz)   10/31/18 (!) 143.3 kg (315 lb 14.7 oz)   07/05/18 (!) 138.7 kg (305 lb 12.5 oz)   06/19/18 (!) 136.9 kg (301 lb 13 oz)   06/16/18 (!) 136.4 kg (300 lb 13.1 oz)       Diabetes Management Status    Statin: Not taking  ACE/ARB: Not taking      Screening or Prevention Patient's value   HgA1C Testing and Control Lab Results   Component Value Date    HGBA1C 11.0 (H) 07/16/2021    HGBA1C 12.1 (H) 02/02/2021    HGBA1C 11.3 (H) 07/22/2020    HGBA1C 9.6 (H) 08/08/2019    HGBA1C 6.4 (H) 01/18/2019      Lipid profile  Most Recent Lipid Panel Health  Maintenance Topic Completion: Not Found   LDL control (goal LDL <70) Lab Results   Component Value Date    LDLCALC 87.0 07/16/2021      Nephropathy screening Lab Results   Component Value Date    LABMICR 10.0 02/02/2021     Lab Results   Component Value Date    PROTEINUA Negative 06/15/2018      Blood pressure BP Readings from Last 3 Encounters:   07/16/21 123/71   02/02/21 (!) 147/69   07/22/20 139/81      Dilated retinal exam Most Recent Eye Exam Date: Not Found   Foot exam Most Recent Foot Exam Date: Not Found        Regarding Hypothyroidism:    - Duration of hypothyroidism:  Diagnosed in 2023     - Etiology: Hashimoto's Thyroiditis  - Contributing Factors None  - Current relevant medications: levothyroxine T4 (Synthroid) 75 mcg daily  - Weight based dosing ([weight in kg] x 1.6):  129 mcg based on 81 kg weight  - Takes thyroid medications improperly:  taking with other medicines    Lab Results   Component Value Date    TSH 5.459 (H) 07/16/2021    TSH 2.624 02/02/2021    TSH 2.095 07/22/2020    FREET4 1.18 07/16/2021    FREET4 1.12 02/02/2021    FREET4 1.28 07/22/2020    THYROPEROXID 365.5 (H) 10/31/2018    THYROPEROXID 365.5 (H) 10/31/2018     07/16/2021     08/08/2019     (H) 07/16/2021     (H) 08/08/2019     - Most recent thyroid imaging:  None    - Most recent DEXA:  None    - Current symptoms:   No   Yes  [x]    []   Weight gain (weight loss)  []    [x]   Fatigue  [x]    []   Constipation  [x]    []   Hair loss  [x]    []   Brittle nails  [x]    []   Mental fog  []    [x]   Cold intolerance  [x]    []   Memory impair  []    [x]   Muscle weakness  [x]    []   Neck swelling, pain, pressure  [x]    []   Hoarseness  [x]    []   Periorbital edema  [x]    []   Lithium or Amiodarone use  [x]    []   Chemotherapy  [x]    []   Prior neck radiation  [x]    []   Recent severe illness    - Personal or Family History:  No   Yes  [x]    []   Thyroid disorder   [x]    []   Thyroid cancer      ROS:  "  As above    Objective:     There were no vitals taken for this visit.  BP Readings from Last 3 Encounters:   07/16/21 123/71   02/02/21 (!) 147/69   07/22/20 139/81     Wt Readings from Last 1 Encounters:   07/16/21 0831 119.6 kg (263 lb 9 oz)     There is no height or weight on file to calculate BMI.    Ht:  5'11"  Wt: 180 (81 kg)  BMI:  25.1    Physical Exam  Constitutional:       Appearance: Normal appearance.   Neurological:      Mental Status: He is alert.   Psychiatric:         Mood and Affect: Mood normal.         Behavior: Behavior normal.     Lab Review:   Lab Results   Component Value Date    HGBA1C 11.0 (H) 07/16/2021     Lab Results   Component Value Date    CHOL 155 07/16/2021    HDL 38 (L) 07/16/2021    LDLCALC 87.0 07/16/2021    TRIG 150 07/16/2021    CHOLHDL 24.5 07/16/2021     Lab Results   Component Value Date     07/16/2021    K 4.0 07/16/2021     07/16/2021    CO2 24 07/16/2021     (H) 07/16/2021    BUN 10 07/16/2021    CREATININE 0.9 07/16/2021    CALCIUM 9.7 07/16/2021    PROT 6.8 07/16/2021    ALBUMIN 4.2 07/16/2021    BILITOT 0.7 07/16/2021    ALKPHOS 62 07/16/2021    AST 12 07/16/2021    ALT 12 07/16/2021    ANIONGAP 14 07/16/2021    ESTGFRAFRICA >60.0 07/16/2021    EGFRNONAA >60.0 07/16/2021    TSH 5.459 (H) 07/16/2021     No results found for: "[id number]"    Assessment and Plan     Type 1 diabetes mellitus without complication  Reviewed history, labs and current treatments.  No glucose data to view except for self reported ranges recently (mostly above 200).  Has dosing of insulin which is higher than weight predicted dosing.  ICR and ISF both stronger than expected so possibly a component of insulin resistance with known use of Metformin as well.    He does have some interests in insulin pump use for the future.  Known type 1 status based on prior antibody positive (JOSE MANUEL).      Plan:  - Start CGM (prescribing Dexcom G7 to pharmacy)  - Continue Lantus and Humalog at " current dosing for now until more glucose data can be reviewed  - Labs soon with BMP, A1c, lipid panel and urine microalbumin  - Diabetes education visit in 2-3 weeks to set up remote monitoring of CGM and also review insulin pump options    Autoimmune hypothyroidism  Known history of Hashimoto's with elevated TPO Ab.  Reviewed current dosing of LT4 and dosing habits. Dose is less than weight based predicted dosing so possible some residual thyroid activity but no recent labs to review.      Plan:  - Continue Levothyroxine 75 mcg daily for now  - Adjust to early morning dosing with pill taken on it's own and waiting 30 min for food or other meds  - Repeat TSH soon      RTC in 3-4 months.  Labs soon      **Visit today included increased complexity associated with the care of the problems addressed and managing the longitudinal care of the patient due to the serious and/or complex managed problems      Roman Newell DO     Disclaimer: This note has been generated using voice-recognition software. There may be typographical errors that have been missed during proof-reading.

## 2024-09-30 ENCOUNTER — LAB VISIT (OUTPATIENT)
Dept: LAB | Facility: HOSPITAL | Age: 24
End: 2024-09-30
Payer: MEDICAID

## 2024-09-30 DIAGNOSIS — E10.9 TYPE 1 DIABETES MELLITUS WITHOUT COMPLICATION: ICD-10-CM

## 2024-09-30 DIAGNOSIS — E06.3 AUTOIMMUNE HYPOTHYROIDISM: ICD-10-CM

## 2024-09-30 LAB
ANION GAP SERPL CALC-SCNC: 12 MMOL/L (ref 8–16)
BUN SERPL-MCNC: 7 MG/DL (ref 6–20)
CALCIUM SERPL-MCNC: 9.9 MG/DL (ref 8.7–10.5)
CHLORIDE SERPL-SCNC: 98 MMOL/L (ref 95–110)
CHOLEST SERPL-MCNC: 165 MG/DL (ref 120–199)
CHOLEST/HDLC SERPL: 3.3 {RATIO} (ref 2–5)
CO2 SERPL-SCNC: 25 MMOL/L (ref 23–29)
CREAT SERPL-MCNC: 1.1 MG/DL (ref 0.5–1.4)
EST. GFR  (NO RACE VARIABLE): >60 ML/MIN/1.73 M^2
GLUCOSE SERPL-MCNC: 355 MG/DL (ref 70–110)
HDLC SERPL-MCNC: 50 MG/DL (ref 40–75)
HDLC SERPL: 30.3 % (ref 20–50)
LDLC SERPL CALC-MCNC: 78.2 MG/DL (ref 63–159)
NONHDLC SERPL-MCNC: 115 MG/DL
POTASSIUM SERPL-SCNC: 3.6 MMOL/L (ref 3.5–5.1)
SODIUM SERPL-SCNC: 135 MMOL/L (ref 136–145)
TRIGL SERPL-MCNC: 184 MG/DL (ref 30–150)
TSH SERPL DL<=0.005 MIU/L-ACNC: 1.96 UIU/ML (ref 0.4–4)

## 2024-09-30 PROCEDURE — 80061 LIPID PANEL: CPT | Performed by: STUDENT IN AN ORGANIZED HEALTH CARE EDUCATION/TRAINING PROGRAM

## 2024-09-30 PROCEDURE — 84443 ASSAY THYROID STIM HORMONE: CPT | Performed by: STUDENT IN AN ORGANIZED HEALTH CARE EDUCATION/TRAINING PROGRAM

## 2024-09-30 PROCEDURE — 80048 BASIC METABOLIC PNL TOTAL CA: CPT | Performed by: STUDENT IN AN ORGANIZED HEALTH CARE EDUCATION/TRAINING PROGRAM

## 2024-09-30 PROCEDURE — 83036 HEMOGLOBIN GLYCOSYLATED A1C: CPT | Performed by: STUDENT IN AN ORGANIZED HEALTH CARE EDUCATION/TRAINING PROGRAM

## 2024-09-30 PROCEDURE — 36415 COLL VENOUS BLD VENIPUNCTURE: CPT | Performed by: STUDENT IN AN ORGANIZED HEALTH CARE EDUCATION/TRAINING PROGRAM

## 2024-09-30 NOTE — PATIENT INSTRUCTIONS
Thank you for visiting with me virtually this week.      As reviewed we can have you continue on your current dosing of insulin for now until we can set you up with a CGM and obtain some more data for proper dose adjustments.  Based on your reported weight my prediction for dosing would be that you should need 20 units of tresiba a day and humalog with a carb ratio of 1:11 with a correction factor of approx 1:40 for glucose levels above 150.  You may have a component of insulin resistance hence the need for more insulin and in that case the continued use of metformin is ok as well.      I would like to set you up with diabetes education soon to review your insulin use further and also teach you how to use the Dexcom CGM which I will be prescribing to your pharmacy.  You are welcome to download the Dexcom G7 nilo on your phone and start the device if you are comfortable with following the directions in the nilo.  Below are instructions for how to share info with us through the  dexcom Nilo (Clarity).  If preferred the educators can set this up with you as long as your bring your phone to that visit.  They will also be reviewing info on insulin pumps which would be one of the best treatments for long term blood sugar control.      Sharing Instructions:  To share your dexcom data with me you will need to download the dexcom clarity nilo on your phone (this is a separate nilo than the dexcom G6/G7 nilo that allows you to see your blood sugars).      To give our clinic access to your numbers:    Download the Dexcom Clarity nilo from your nilo store (same login used as for Dexcom nilo)  In the Clarity nilo, Choose Profile >  Authorize Sharing > enter code [email or web address]  Reply with a [facility] message once done so we can confirm    Please note that I do not routinely monitor these numbers but can look at them at the time of a visit or if we need to make changes between visits.        In addition to checking labs soon for  "your diabetes control we will also check your thyroid function.  You can continue on your current dose of Levothyroxine but we may need to adjust to a different dose based on labs.  For now the best way to take the thyroid medicine is as below.    As a review, it is best to take the levothyroxine in the morning on an empty stomach, and not eat, drink or take medications for at least ~30 minutes after taking it to maximize its absorption.  Please avoid taking any multi-vitamins (anything with iron) or calcium supplements for at least 4 hours after taking the medication.  If you miss the dose on one day, take two doses the next morning to make up for the missed dose      Our staff is going to arrange labs and the education visit.  We will plan a repeat visit in 3-4 months with us.  If you have interests in an insulin pump after your education visit we can work on getting one approved for you along with teaching for this.  You can then follow in one of our special insulin pump clinics with one of my physician colleagues (this clinic is set aside for insulin pump patients only and often there are diabetes educators available for trouble shooting at those visits.)    Reach out with any questions you may have.        "15-15 Rule" for hypoglycemia treatment  - Many people tend to want to eat as much as they can for low blood glucose until they feel better. This can cause blood glucose levels to go very high, which is bad. Using the step-wise approach of the "15-15 Rule" can help you correct a low blood glucose while also preventing subsequent high blood glucose levels  - If you experience an episode of hypoglycemia (glucose less than 70), please follow the "15-15 rule": Take 15 grams of carbohydrate (see examples below) to raise your blood sugar and recheck it after 15 minutes. If still below 70 mg/dL, take another 15 gram serving of carbohydrate  -Repeat these steps until your blood sugar is at least 70 mg/dL. Once your " blood sugar is back to normal, eat a small serving of protein to make sure it doesnt lower again    -Examples of 15g of carbohydrate:  4 ounces (1/2 cup) of juice or regular soda (not diet)  1 tablespoon of sugar, honey, or corn syrup  Hard candies, jellybeans, or gumdrops--see food label for how many to consume  Make a note about any episodes of low blood sugar and talk with your health care team if they are recurrent     - Note: When treating a low blood glucose, the choice of carbohydrate source is important. Complex carbohydrates, or foods that contain fats along with carbs (like chocolate) can slow the absorption of glucose and should not be used to treat an emergency low    - For more information, please visit:  [email or web address]/diabetes/[email or web address]-management/blood-glucose-testing-[email or web address]-control/hypoglycemia

## 2024-09-30 NOTE — ASSESSMENT & PLAN NOTE
Reviewed history, labs and current treatments.  No glucose data to view except for self reported ranges recently (mostly above 200).  Has dosing of insulin which is higher than weight predicted dosing.  ICR and ISF both stronger than expected so possibly a component of insulin resistance with known use of Metformin as well.    He does have some interests in insulin pump use for the future.  Known type 1 status based on prior antibody positive (JOSE MANUEL).      Plan:  - Start CGM (prescribing Dexcom G7 to pharmacy)  - Continue Lantus and Humalog at current dosing for now until more glucose data can be reviewed  - Labs soon with BMP, A1c, lipid panel and urine microalbumin  - Diabetes education visit in 2-3 weeks to set up remote monitoring of CGM and also review insulin pump options

## 2024-09-30 NOTE — ASSESSMENT & PLAN NOTE
Known history of Hashimoto's with elevated TPO Ab.  Reviewed current dosing of LT4 and dosing habits. Dose is less than weight based predicted dosing so possible some residual thyroid activity but no recent labs to review.      Plan:  - Continue Levothyroxine 75 mcg daily for now  - Adjust to early morning dosing with pill taken on it's own and waiting 30 min for food or other meds  - Repeat TSH soon

## 2024-10-01 LAB
ESTIMATED AVG GLUCOSE: 355 MG/DL (ref 68–131)
HBA1C MFR BLD: 14 % (ref 4–5.6)

## 2025-01-30 NOTE — TELEPHONE ENCOUNTER
Called patient regarding refill request sent by [facility].  Pt stated he does not need a refill at this time.  Pt advised f/u appt due; appt scheduled for Oct 31st at 10a with Katiana (TAY.P.).  Patient verbalized understanding.   Left lower leg pain/injury tonight. Tripped over the cat. Hx of surgery  with hardware to that leg. Pt is ambulatory with a limp.

## 2025-04-09 ENCOUNTER — PATIENT MESSAGE (OUTPATIENT)
Facility: CLINIC | Age: 25
End: 2025-04-09

## 2025-04-11 ENCOUNTER — TELEPHONE (OUTPATIENT)
Dept: ENDOCRINOLOGY | Facility: CLINIC | Age: 25
End: 2025-04-11
Payer: MEDICAID

## 2025-04-11 DIAGNOSIS — E10.9 TYPE 1 DIABETES MELLITUS WITHOUT COMPLICATION: ICD-10-CM

## 2025-04-11 DIAGNOSIS — R20.2 NUMBNESS AND TINGLING OF FOOT: Primary | ICD-10-CM

## 2025-04-11 DIAGNOSIS — R20.0 NUMBNESS AND TINGLING OF FOOT: Primary | ICD-10-CM

## 2025-04-11 NOTE — TELEPHONE ENCOUNTER
Referral to podiatry being sent due to issues with numbness/tingling of the feet that has been worsening and is painful.

## 2025-04-14 ENCOUNTER — TELEPHONE (OUTPATIENT)
Dept: ENDOCRINOLOGY | Facility: CLINIC | Age: 25
End: 2025-04-14

## 2025-04-14 DIAGNOSIS — G62.9 NEUROPATHY: Primary | ICD-10-CM

## 2025-04-14 RX ORDER — DULOXETIN HYDROCHLORIDE 30 MG/1
30 CAPSULE, DELAYED RELEASE ORAL DAILY
Qty: 30 CAPSULE | Refills: 2 | Status: SHIPPED | OUTPATIENT
Start: 2025-04-14 | End: 2026-04-14

## 2025-04-14 NOTE — TELEPHONE ENCOUNTER
Complaints of pain with numb/tingling in feet recently.  Will place order for a short supply of Duloxetine while he waits for podiatry visit and/or PCP visit.

## 2025-04-28 ENCOUNTER — OFFICE VISIT (OUTPATIENT)
Dept: PODIATRY | Facility: CLINIC | Age: 25
End: 2025-04-28

## 2025-04-28 VITALS — HEIGHT: 71 IN | BODY MASS INDEX: 36.92 KG/M2 | WEIGHT: 263.69 LBS

## 2025-04-28 DIAGNOSIS — R20.2 NUMBNESS AND TINGLING OF FOOT: ICD-10-CM

## 2025-04-28 DIAGNOSIS — R20.0 NUMBNESS AND TINGLING OF FOOT: ICD-10-CM

## 2025-04-28 DIAGNOSIS — E10.9 TYPE 1 DIABETES MELLITUS WITHOUT COMPLICATION: ICD-10-CM

## 2025-04-28 PROCEDURE — 99213 OFFICE O/P EST LOW 20 MIN: CPT | Mod: PBBFAC,PO | Performed by: PODIATRIST

## 2025-04-28 PROCEDURE — 99999 PR PBB SHADOW E&M-EST. PATIENT-LVL III: CPT | Mod: PBBFAC,,, | Performed by: PODIATRIST

## 2025-04-28 PROCEDURE — 99203 OFFICE O/P NEW LOW 30 MIN: CPT | Mod: S$PBB,,, | Performed by: PODIATRIST

## 2025-04-28 RX ORDER — DICLOFENAC SODIUM 10 MG/G
2 GEL TOPICAL 4 TIMES DAILY
Qty: 450 G | Refills: 3 | Status: SHIPPED | OUTPATIENT
Start: 2025-04-28

## 2025-04-29 NOTE — PROGRESS NOTES
ENDOCRINOLOGY FOLLOW-UP PATIENT VISIT:     Subjective:      Patient ID: Farshad Chavez is a 24 y.o. male.    Chief Complaint:  Diabetes    History of Present Illness  Jack Chavez presented 9/2024 to establish care for his diabetes and thyroid problems.  He previously followed with Dr. Gillis through [facility] for endocrine care but now is wanting to establish with [facility].  Had been on a CGM in the past but using glucometer with carb ratio for insulin use and correction factor at previous visit.  Has since started back on Dexcom since visit.  Prior A1c values have been double digits mostly with most recent below goal. Previous A1c 14 with most recent GMI 6.6.    Patient has extensive history of pain and burning to his feet and between toes.  Has been having issues sleeping at night.  Is currently on Cymbalta which is providing minimal relief.  Was prescribed diclofenac sodium on 4/28/2025 at most recent podiatry visit and symptoms have improved.      At today's visit, patient reports lows at night having to have a snack before to avoid lows.    Regarding diabetes:    Initially Diagnosed with T1D with hospitalization for DKA June 2018.     Latest Reference Range & Units 07/22/20 14:57   C-Peptide 0.78 - 5.19 ng/mL 0.83        Latest Reference Range & Units 06/12/18 19:45 06/12/18 22:22   Hemoglobin A1C External 4.0 - 5.6 % 10.4 (H)    Estimated Avg Glucose 68 - 131 mg/dL 252 (H)    C-Peptide 0.78 - 5.19 ng/mL  1.90   Glutamic Acid Decarb Ab <=0.02 nmol/L  0.15 (H)   Human Insulin Ab 0.00 - 0.02 nmol/L 0.00    ISLET CELL AB <1:4  <1:4    (H): Data is abnormally high      Predicted ISF/ICR based on weight: (81 kg)  1700/40 = 42.5 ISF  450/40 = 11.25 ICR    Current symptoms/problems:   Denies any polyuria, polydipsia, nocturia, nausea vomiting, abdominal discomfort, numbness/tingling, visual change, weight change     Denies h/o frequent UTIs/yeast infections, DKA, ketogenic diet, diuretics   Denies h/o pancreatitis, recurrent  gallstones, daily etoh use, MEN syndrome, pancreatic tumors, gastroparesis, diabetic retinopathy    Known diabetic complications: none  Cardiovascular risk factors: diabetes mellitus    Current diabetic medications include:    Tresiba 32 units qHS  Humalog - 1:8 ratio for carbs then ISF of 1:40 for glucose levels above 150.      Other medications tried:  Fiasp from a sample pen    Diet: on average, 2 meals per day and some snacks  BF:  Trying to start eating breakfast (Eggs, remy, toast)  L:  Variable  D:  Variable  Snacks after lunch and sometimes dinner:  Bag of chips, PB crackers (he will not always)    Exercise: Active at work and will sometimes play basketball  Works at the [employer] in [place]    Glucose Trends:  Dexcom G7        Any episodes of hypoglycemia? Occasionally overnight or in the early morning hours.  Occasionally Symptomatic.  Verifies with FSBG and low reading is accurate    Family Hx:  Grandfather - Diabetes     Wt Readings from Last 10 Encounters:   04/30/25 79.1 kg (174 lb 6.1 oz)   04/28/25 119.6 kg (263 lb 10.7 oz)   07/16/21 119.6 kg (263 lb 9 oz)   02/02/21 117.3 kg (258 lb 7.8 oz)   07/22/20 120.8 kg (266 lb 6.8 oz)   10/28/19 131 kg (288 lb 12.8 oz)   08/08/19 (!) 137.4 kg (302 lb 14.6 oz)   01/18/19 (!) 143 kg (315 lb 4.1 oz)   10/31/18 (!) 143.3 kg (315 lb 14.7 oz)   07/05/18 (!) 138.7 kg (305 lb 12.5 oz)       Diabetes Management Status    Statin: Not taking  ACE/ARB: Not taking      Screening or Prevention Patient's value   HgA1C Testing and Control Lab Results   Component Value Date    HGBA1C 7.2 (H) 04/30/2025    HGBA1C 14.0 (H) 09/30/2024    HGBA1C 11.0 (H) 07/16/2021    HGBA1C 12.1 (H) 02/02/2021    HGBA1C 11.3 (H) 07/22/2020      Lipid profile  : 09/30/2024   LDL control (goal LDL <70) Lab Results   Component Value Date    LDLCALC 78.2 09/30/2024      Nephropathy screening Lab Results   Component Value Date    LABMICR 10.0 02/02/2021     Lab Results   Component Value Date     "PROTEINUA Negative 06/15/2018      Blood pressure BP Readings from Last 3 Encounters:   04/30/25 116/78   07/16/21 123/71   02/02/21 (!) 147/69      Dilated retinal exam Not UTD   Foot exam 4/28/2025      Denies:  Injection sites are without edema or erythema. No lipo hypertropthy or atrophy.  Reports Compliant    Regarding Hypothyroidism:    - Duration of hypothyroidism:  Diagnosed in 2023     - Etiology: Hashimoto's Thyroiditis  - Contributing Factors None  - Current relevant medications: levothyroxine T4 (Synthroid) 75 mcg daily  - Weight based dosing ([weight in kg] x 1.6):  129 mcg based on 81 kg weight  - Takes thyroid medications improperly:  taking with other medicines    Lab Results   Component Value Date    TSH 2.559 04/30/2025    TSH 1.962 09/30/2024    TSH 5.459 (H) 07/16/2021    FREET4 1.18 07/16/2021    FREET4 1.12 02/02/2021    FREET4 1.28 07/22/2020    THYROPEROXID 365.5 (H) 10/31/2018    THYROPEROXID 365.5 (H) 10/31/2018     04/30/2025     (L) 09/30/2024     (H) 04/30/2025     (H) 09/30/2024     - Most recent thyroid imaging:  None    - Most recent DEXA:  None    - Current symptoms:   No   Yes  [x]    []   Weight gain (weight loss)  []    [x]   Fatigue - Has improved due to increased sleep.  [x]    []   Constipation  [x]    []   Hair loss  [x]    []   Brittle nails  [x]    []   Mental fog  []    [x]   Cold intolerance  [x]    []   Memory impair  []    [x]   Muscle weakness  [x]    []   Neck swelling, pain, pressure  [x]    []   Hoarseness  [x]    []   Periorbital edema  [x]    []   Lithium or Amiodarone use  [x]    []   Chemotherapy  [x]    []   Prior neck radiation  [x]    []   Recent severe illness    - Personal or Family History:  No   Yes  [x]    []   Thyroid disorder   [x]    []   Thyroid cancer      ROS: As above    Objective:     /78 (BP Location: Right arm, Patient Position: Sitting)   Ht 5' 11" (1.803 m)   Wt 79.1 kg (174 lb 6.1 oz)   BMI 24.32 kg/m²   BP " "Readings from Last 3 Encounters:   04/30/25 116/78   07/16/21 123/71   02/02/21 (!) 147/69     Wt Readings from Last 1 Encounters:   04/30/25 1052 79.1 kg (174 lb 6.1 oz)     Body mass index is 24.32 kg/m².    Ht:  5'11"  Wt: 180 (81 kg)  BMI:  25.1    Physical Exam  Constitutional:       Appearance: He is normal weight.   HENT:      Head: Normocephalic.   Pulmonary:      Effort: Pulmonary effort is normal.   Musculoskeletal:         General: Normal range of motion.      Cervical back: Normal range of motion.   Neurological:      Mental Status: He is alert and oriented to person, place, and time.   Psychiatric:         Mood and Affect: Mood normal.       Lab Review:   Lab Results   Component Value Date    HGBA1C 7.2 (H) 04/30/2025     Lab Results   Component Value Date    CHOL 165 09/30/2024    HDL 50 09/30/2024    LDLCALC 78.2 09/30/2024    TRIG 184 (H) 09/30/2024    CHOLHDL 30.3 09/30/2024     Lab Results   Component Value Date     04/30/2025    K 4.8 04/30/2025     04/30/2025    CO2 32 (H) 04/30/2025     (H) 04/30/2025    BUN 8 04/30/2025    CREATININE 0.8 04/30/2025    CALCIUM 10.2 04/30/2025    PROT 7.7 04/30/2025    ALBUMIN 4.9 04/30/2025    BILITOT 1.1 (H) 04/30/2025    ALKPHOS 63 04/30/2025    AST 12 04/30/2025    ALT 13 04/30/2025    ANIONGAP 8 04/30/2025    ESTGFRAFRICA >60.0 07/16/2021    EGFRNONAA >60.0 07/16/2021    TSH 2.559 04/30/2025     No results found for: "[id number]"    Assessment and Plan     Type 1 diabetes mellitus without complication  -- Reviewed goals of therapy are to get the best control we can without hypoglycemia  -- Labs today and prior to follow-up.  -- A1c above goal <7% with GMI 6.5.  Repeat A1c ordered today.  -- Reviewed logs/CGM:  Dexcom report reviewed with excellent TIR of 87% <2% low, GMI 6.5%, and average .  Occasional overnight low.  Will reduce Tresiba for optimal BG control reducing risk of overnight lows.  -- Reviewed patient's current insulin " regimen. Clarified proper insulin dose and timing in relation to meals, etc. Insulin injection sites and proper rotation instructed.    -- Advised frequent self blood glucose monitoring.  Patient encouraged to document glucose results and bring them to every clinic visit    -- Hypoglycemia precautions discussed.  -- Call for Bg repeatedly < 70 or > 180.   -- Close adherence to lifestyle changes recommended.   -- Periodic follow ups for eye evaluations, foot care and dental care suggested.  -- Encouraged exercise per ADA guidelines.    -- Encouraged dietary modifications including but not limited to DM diet, low carb snacks, marrying carbohydrates with proteins.  Medication Regimen  Reduce Tresiba 28 units qHS  Humalog - 1:8 ratio for carbs then ISF of 1:40 for glucose levels above 150.           Instructions provided that if patient continues to have low blood sugars after a week you can further reduce your Tresiba to 26 units    Autoimmune hypothyroidism  -- Biochemically euthyroid  -- Goal is a normal TSH  -- Avoid exogenous hyperthyroidism as this can accelerate bone loss and increase risk of CV complications.  -- Advised to take LT4 on an empty stomach with water and to wait 30-45 minutes before eating or taking other medications   -- Calcium and iron need to be  from thyroid hormone replacement by 4 or > hours.  -- Reviewed usual times of thyroid hormone changes  -- Reviewed that symptoms of hypothyroidism may not correlate with tsh, and a normal TSH is the goal of therapy. Symptoms are not a justification for over treatment.  -- Please note: if you are taking biotin please hold it for 5 days prior to labs as it can interfere with the thyroid testing.  -- Labs today and prior to follow-up  -- Medication Changes:    Continue Levothyroxine 75 mcg dialy      RT in 3-4 months.  Labs soon    Lisa Lier, FNP-C [facility]     Case discussed with Dr. Kirkland  Recommendations were discussed with the  patient in detail  The patient verbalized understanding and agrees with the plan outlined as above.     Visit today included increased complexity associated with the care of the problems addressed and managing the longitudinal care of the patient due to the serious and/or complex managed problems.

## 2025-04-29 NOTE — PROGRESS NOTES
Subjective:     Patient ID: Farshad Chavez is a 24 y.o. male.    Chief Complaint: Diabetes Mellitus (09/27/24 ENDO Dr Newell ) and Numbness    Farshad is a 24 y.o. male who presents to the clinic upon referral from Dr. Newell  for evaluation and treatment of diabetic feet. Farshad has a past medical history of Diabetes type 1, controlled. Presents for diabetic foot risk assessment.       PCP: No, Primary Doctor    Date Last Seen by PCP: per above    Current shoe gear: Tennis shoes    Hemoglobin A1C   Date Value Ref Range Status   09/30/2024 14.0 (H) 4.0 - 5.6 % Final     Comment:     ADA Screening Guidelines:  5.7-6.4%  Consistent with prediabetes  >or=6.5%  Consistent with diabetes    High levels of fetal hemoglobin interfere with the HbA1C  assay. Heterozygous hemoglobin variants (HbS, HgC, etc)do  not significantly interfere with this assay.   However, presence of multiple variants may affect accuracy.     07/16/2021 11.0 (H) 4.0 - 5.6 % Final     Comment:     ADA Screening Guidelines:  5.7-6.4%  Consistent with prediabetes  >or=6.5%  Consistent with diabetes    High levels of fetal hemoglobin interfere with the HbA1C  assay. Heterozygous hemoglobin variants (HbS, HgC, etc)do  not significantly interfere with this assay.   However, presence of multiple variants may affect accuracy.     02/02/2021 12.1 (H) 4.0 - 5.6 % Final     Comment:     ADA Screening Guidelines:  5.7-6.4%  Consistent with prediabetes  >or=6.5%  Consistent with diabetes    High levels of fetal hemoglobin interfere with the HbA1C  assay. Heterozygous hemoglobin variants (HbS, HgC, etc)do  not significantly interfere with this assay.   However, presence of multiple variants may affect accuracy.           Review of Systems   Constitutional: Negative for chills.   Cardiovascular:  Negative for chest pain and claudication.   Respiratory:  Negative for cough.    Skin:  Positive for color change, dry skin and nail changes.   Musculoskeletal:  Positive  "for joint pain.   Gastrointestinal:  Negative for nausea.   Neurological:  Positive for paresthesias. Negative for numbness.   Psychiatric/Behavioral:  The patient is not nervous/anxious.         Objective:     Physical Exam  Constitutional:       Appearance: He is well-developed.      Comments: Oriented to time, place, and person.   Cardiovascular:      Comments: DP and PT pulses are palpable bilaterally. 3 sec capillary refill time and toes and feet are warm to touch proximally .  There is  hair growth on the feet and toes b/l. There is no edema b/l. No spider veins or varicosities present b/l.     Musculoskeletal:      Comments: Equinus noted b/l ankles with < 10 deg DF noted. MMT 5/5 in DF/PF/Inv/Ev resistance with no reproduction of pain in any direction. Passive range of motion of ankle and pedal joints is painless b/l.     Feet:      Right foot:      Skin integrity: No callus or dry skin.      Left foot:      Skin integrity: No callus or dry skin.   Lymphadenopathy:      Comments: Negative lymphadenopathy bilateral popliteal fossa and tarsal tunnel.   Skin:     Comments: No open lesions, lacerations or wounds noted.Interdigital spaces clean, dry and intact b/l. No erythema noted to b/l foot.  Nails normal color and trophic qualities.     Neurological:      Mental Status: He is alert.      Comments: Light touch, proprioception, and sharp/dull sensation are all intact bilaterally. Protective threshold with the Cable-Wienstein monofilament is intact bilaterally.      Subjective paresthesias with no clearly identifiable source or trigger.      Psychiatric:         Behavior: Behavior is cooperative.           Assessment:      Encounter Diagnoses   Name Primary?    Numbness and tingling of foot     Type 1 diabetes mellitus without complication      Plan:     Farshad"Jack" was seen today for diabetes mellitus and numbness.    Diagnoses and all orders for this visit:    Numbness and tingling of foot  -     " Ambulatory referral/consult to Podiatry    Type 1 diabetes mellitus without complication  -     Ambulatory referral/consult to Podiatry    Other orders  -     diclofenac sodium (VOLTAREN ARTHRITIS PAIN) 1 % Gel; Apply 2 g topically 4 (four) times daily.      I counseled the patient on his conditions, their implications and medical management.        - Shoe inspection. Diabetic Foot Education. Patient reminded of the importance of good nutrition and blood sugar control to help prevent podiatric complications of diabetes. Patient instructed on proper foot hygeine. We discussed wearing proper shoe gear, daily foot inspections, never walking without protective shoe gear, caution putting sharp instruments to feet     - Discussed DM foot care:  Wear comfortable, proper fitting shoes. Wash feet daily. Dry well. After drying, apply moisturizer to feet (no lotion to webspaces). Inspect feet daily for skin breaks, blisters, swelling, or redness. Wear cotton socks (preferably white)  Change socks every day. Do NOT walk barefoot. Do NOT use heating pads or warm/hot water soaks     Rx Voltaren gel to be applied to affected area up to 3-4 x daily as needed for pain    Discussed conservative treatment with shoes of adequate dimensions, material, and style to alleviate symptoms and delay or prevent surgical intervention.    RTC PRN

## 2025-04-30 ENCOUNTER — LAB VISIT (OUTPATIENT)
Dept: LAB | Facility: HOSPITAL | Age: 25
End: 2025-04-30

## 2025-04-30 ENCOUNTER — OFFICE VISIT (OUTPATIENT)
Dept: ENDOCRINOLOGY | Facility: CLINIC | Age: 25
End: 2025-04-30

## 2025-04-30 VITALS
BODY MASS INDEX: 24.41 KG/M2 | HEIGHT: 71 IN | SYSTOLIC BLOOD PRESSURE: 116 MMHG | WEIGHT: 174.38 LBS | DIASTOLIC BLOOD PRESSURE: 78 MMHG

## 2025-04-30 DIAGNOSIS — E10.9 TYPE 1 DIABETES MELLITUS WITHOUT COMPLICATIONS: ICD-10-CM

## 2025-04-30 DIAGNOSIS — E10.9 TYPE 1 DIABETES MELLITUS WITHOUT COMPLICATION: ICD-10-CM

## 2025-04-30 DIAGNOSIS — E10.9 TYPE 1 DIABETES MELLITUS WITHOUT COMPLICATION: Primary | ICD-10-CM

## 2025-04-30 DIAGNOSIS — E06.3 AUTOIMMUNE HYPOTHYROIDISM: ICD-10-CM

## 2025-04-30 PROBLEM — E66.09 OBESITY DUE TO EXCESS CALORIES WITH SERIOUS COMORBIDITY: Status: RESOLVED | Noted: 2020-04-21 | Resolved: 2025-04-30

## 2025-04-30 LAB
ALBUMIN SERPL BCP-MCNC: 4.9 G/DL (ref 3.5–5.2)
ALBUMIN/CREAT UR: 35.2 UG/MG
ALP SERPL-CCNC: 63 UNIT/L (ref 40–150)
ALT SERPL W/O P-5'-P-CCNC: 13 UNIT/L (ref 10–44)
ANION GAP (OHS): 8 MMOL/L (ref 8–16)
AST SERPL-CCNC: 12 UNIT/L (ref 11–45)
BILIRUB SERPL-MCNC: 1.1 MG/DL (ref 0.1–1)
BUN SERPL-MCNC: 8 MG/DL (ref 6–20)
C PEPTIDE SERPL-MCNC: 0.36 NG/ML (ref 0.78–5.19)
CALCIUM SERPL-MCNC: 10.2 MG/DL (ref 8.7–10.5)
CHLORIDE SERPL-SCNC: 102 MMOL/L (ref 95–110)
CO2 SERPL-SCNC: 32 MMOL/L (ref 23–29)
CREAT SERPL-MCNC: 0.8 MG/DL (ref 0.5–1.4)
CREAT UR-MCNC: 415 MG/DL (ref 23–375)
EAG (OHS): 160 MG/DL (ref 68–131)
GFR SERPLBLD CREATININE-BSD FMLA CKD-EPI: >60 ML/MIN/1.73/M2
GLUCOSE SERPL-MCNC: 196 MG/DL (ref 70–110)
HBA1C MFR BLD: 7.2 % (ref 4–5.6)
MICROALBUMIN UR-MCNC: 146 UG/ML (ref ?–5000)
POTASSIUM SERPL-SCNC: 4.8 MMOL/L (ref 3.5–5.1)
PROT SERPL-MCNC: 7.7 GM/DL (ref 6–8.4)
SODIUM SERPL-SCNC: 142 MMOL/L (ref 136–145)
TSH SERPL-ACNC: 2.56 UIU/ML (ref 0.4–4)

## 2025-04-30 PROCEDURE — 84443 ASSAY THYROID STIM HORMONE: CPT

## 2025-04-30 PROCEDURE — 82043 UR ALBUMIN QUANTITATIVE: CPT

## 2025-04-30 PROCEDURE — 36415 COLL VENOUS BLD VENIPUNCTURE: CPT

## 2025-04-30 PROCEDURE — 99215 OFFICE O/P EST HI 40 MIN: CPT | Mod: PBBFAC

## 2025-04-30 PROCEDURE — 84681 ASSAY OF C-PEPTIDE: CPT

## 2025-04-30 PROCEDURE — 82040 ASSAY OF SERUM ALBUMIN: CPT

## 2025-04-30 PROCEDURE — 83036 HEMOGLOBIN GLYCOSYLATED A1C: CPT

## 2025-04-30 PROCEDURE — 99999 PR PBB SHADOW E&M-EST. PATIENT-LVL V: CPT | Mod: PBBFAC,,,

## 2025-04-30 RX ORDER — INSULIN DEGLUDEC 100 U/ML
28 INJECTION, SOLUTION SUBCUTANEOUS DAILY
Qty: 25.2 ML | Refills: 3 | Status: SHIPPED | OUTPATIENT
Start: 2025-04-30

## 2025-04-30 RX ORDER — LEVOTHYROXINE SODIUM 75 UG/1
75 TABLET ORAL
Qty: 90 TABLET | Refills: 3 | Status: SHIPPED | OUTPATIENT
Start: 2025-04-30

## 2025-04-30 RX ORDER — VITAMIN B COMPLEX
1 CAPSULE ORAL DAILY
COMMUNITY

## 2025-04-30 NOTE — PATIENT INSTRUCTIONS
Medication Regimen  Reduce Tresiba 28 units qHS  Humalog - 1:8 ratio for carbs then ISF of 1:40 for glucose levels above 150.             If you are still having low blood sugars after a week you can further reduce your Tresiba to 26 units      Carb Cheat Sheet  MOST STARCHES (everything EXCEPT rice, pasta and baked beans):  This includes potatoes, corn, beans, peas, cereal, oatmeal, grits  1 fist = 30 gm carb      RICE, PASTA, and BAKED BEANS:  1 fist = 45 gm carb    1 tbsp sauce (1 thumb) = 5 grams    1 slice bread or small piece of fruit, half a banana, handful of grapes= 15 grams    FRESH FRUIT  1 fist = 15 gm carb    NON-STARCHY VEGGIES  This includes string beans, broccoli, cucumber, salad, squash, tomato, greens, etc  1 fist = 5 gm carb    CAKE (no icing), BROWNIE, COOKIE  2-3 inch square = 15 gm    ICING (buttercream)  1 thumb/1 tbsp = 15 gm    ICING (cool whip icing)  1 thumb/1 tbsp = 5 gm

## 2025-04-30 NOTE — ASSESSMENT & PLAN NOTE
-- Reviewed goals of therapy are to get the best control we can without hypoglycemia  -- Labs today and prior to follow-up.  -- A1c above goal <7% with GMI 6.5.  Repeat A1c ordered today.  -- Reviewed logs/CGM:  Dexcom report reviewed with excellent TIR of 87% <2% low, GMI 6.5%, and average .  Occasional overnight low.  Will reduce Tresiba for optimal BG control reducing risk of overnight lows.  -- Reviewed patient's current insulin regimen. Clarified proper insulin dose and timing in relation to meals, etc. Insulin injection sites and proper rotation instructed.    -- Advised frequent self blood glucose monitoring.  Patient encouraged to document glucose results and bring them to every clinic visit    -- Hypoglycemia precautions discussed.  -- Call for Bg repeatedly < 70 or > 180.   -- Close adherence to lifestyle changes recommended.   -- Periodic follow ups for eye evaluations, foot care and dental care suggested.  -- Encouraged exercise per ADA guidelines.    -- Encouraged dietary modifications including but not limited to DM diet, low carb snacks, marrying carbohydrates with proteins.  Medication Regimen  Reduce Tresiba 28 units qHS  Humalog - 1:8 ratio for carbs then ISF of 1:40 for glucose levels above 150.           Instructions provided that if patient continues to have low blood sugars after a week you can further reduce your Tresiba to 26 units

## 2025-04-30 NOTE — ASSESSMENT & PLAN NOTE
-- Biochemically euthyroid  -- Goal is a normal TSH  -- Avoid exogenous hyperthyroidism as this can accelerate bone loss and increase risk of CV complications.  -- Advised to take LT4 on an empty stomach with water and to wait 30-45 minutes before eating or taking other medications   -- Calcium and iron need to be  from thyroid hormone replacement by 4 or > hours.  -- Reviewed usual times of thyroid hormone changes  -- Reviewed that symptoms of hypothyroidism may not correlate with tsh, and a normal TSH is the goal of therapy. Symptoms are not a justification for over treatment.  -- Please note: if you are taking biotin please hold it for 5 days prior to labs as it can interfere with the thyroid testing.  -- Labs today and prior to follow-up  -- Medication Changes:    Continue Levothyroxine 75 mcg dialy

## 2025-05-01 ENCOUNTER — PATIENT MESSAGE (OUTPATIENT)
Dept: ENDOCRINOLOGY | Facility: CLINIC | Age: 25
End: 2025-05-01

## 2025-05-01 NOTE — TELEPHONE ENCOUNTER
Component      Latest Ref Rng 4/30/2025   Sodium      136 - 145 mmol/L 142    Potassium      3.5 - 5.1 mmol/L 4.8    Chloride      95 - 110 mmol/L 102    CO2      23 - 29 mmol/L 32 (H)    Glucose      70 - 110 mg/dL 196 (H)    BUN      6 - 20 mg/dL 8    Creatinine      0.5 - 1.4 mg/dL 0.8    Calcium      8.7 - 10.5 mg/dL 10.2    PROTEIN TOTAL      6.0 - 8.4 gm/dL 7.7    Albumin      3.5 - 5.2 g/dL 4.9    BILIRUBIN TOTAL      0.1 - 1.0 mg/dL 1.1 (H)    ALP      40 - 150 unit/L 63    AST      11 - 45 unit/L 12    ALT      10 - 44 unit/L 13    Anion Gap      8 - 16 mmol/L 8    eGFR      >60 mL/min/1.73/m2 >60    Urine Microalbumin        ug/mL 146.0    Urine Creatinine      23.0 - 375.0 mg/dL 415.0 (H)    MICROALB/CREAT RATIO      <=30.0 ug/mg 35.2 (H)    Hemoglobin A1C External      4.0 - 5.6 % 7.2 (H)    Estimated Avg Glucose      68 - 131 mg/dL 160 (H)    TSH      0.400 - 4.000 uIU/mL 2.559    C-Peptide      0.78 - 5.19 ng/mL 0.36 (L)       Legend:  (H) High  (L) Low

## 2025-05-16 ENCOUNTER — PATIENT MESSAGE (OUTPATIENT)
Dept: PODIATRY | Facility: CLINIC | Age: 25
End: 2025-05-16

## 2025-05-21 RX ORDER — LIDOCAINE AND PRILOCAINE 25; 25 MG/G; MG/G
CREAM TOPICAL
Qty: 30 G | Refills: 0 | Status: SHIPPED | OUTPATIENT
Start: 2025-05-21

## 2025-06-04 DIAGNOSIS — E10.9 TYPE 1 DIABETES MELLITUS WITHOUT COMPLICATION: ICD-10-CM

## 2025-06-05 RX ORDER — BLOOD-GLUCOSE SENSOR
1 EACH MISCELLANEOUS
Qty: 3 EACH | Refills: 11 | Status: SHIPPED | OUTPATIENT
Start: 2025-06-05 | End: 2026-06-05

## 2025-06-13 ENCOUNTER — PATIENT MESSAGE (OUTPATIENT)
Dept: ENDOCRINOLOGY | Facility: CLINIC | Age: 25
End: 2025-06-13

## 2025-06-13 DIAGNOSIS — E10.65 TYPE 1 DIABETES MELLITUS WITH HYPERGLYCEMIA: ICD-10-CM

## 2025-06-13 RX ORDER — INSULIN LISPRO 100 [IU]/ML
INJECTION, SOLUTION INTRAVENOUS; SUBCUTANEOUS
Qty: 15 ML | Refills: 2 | Status: CANCELLED | OUTPATIENT
Start: 2025-06-13

## 2025-06-15 RX ORDER — INSULIN LISPRO 100 [IU]/ML
INJECTION, SOLUTION INTRAVENOUS; SUBCUTANEOUS
Qty: 15 ML | Refills: 2 | Status: SHIPPED | OUTPATIENT
Start: 2025-06-15 | End: 2025-06-19 | Stop reason: ALTCHOICE

## 2025-06-16 ENCOUNTER — PATIENT MESSAGE (OUTPATIENT)
Dept: ENDOCRINOLOGY | Facility: CLINIC | Age: 25
End: 2025-06-16

## 2025-06-16 DIAGNOSIS — E10.65 TYPE 1 DIABETES MELLITUS WITH HYPERGLYCEMIA: Primary | ICD-10-CM

## 2025-06-16 DIAGNOSIS — E10.9 TYPE 1 DIABETES MELLITUS WITHOUT COMPLICATION: ICD-10-CM

## 2025-06-16 RX ORDER — BLOOD-GLUCOSE SENSOR
1 EACH MISCELLANEOUS
Qty: 3 EACH | Refills: 11 | Status: SHIPPED | OUTPATIENT
Start: 2025-06-16 | End: 2026-06-16

## 2025-06-19 RX ORDER — INSULIN ASPART 100 [IU]/ML
INJECTION, SOLUTION INTRAVENOUS; SUBCUTANEOUS
Qty: 15 ML | Refills: 11 | Status: SHIPPED | OUTPATIENT
Start: 2025-06-19

## 2025-07-20 DIAGNOSIS — E10.9 TYPE 1 DIABETES MELLITUS WITHOUT COMPLICATION: ICD-10-CM

## 2025-07-21 RX ORDER — BLOOD-GLUCOSE SENSOR
1 EACH MISCELLANEOUS
Qty: 3 EACH | Refills: 11 | Status: SHIPPED | OUTPATIENT
Start: 2025-07-21 | End: 2026-07-21

## 2025-07-28 ENCOUNTER — PATIENT MESSAGE (OUTPATIENT)
Dept: PODIATRY | Facility: CLINIC | Age: 25
End: 2025-07-28

## 2025-07-28 ENCOUNTER — PATIENT MESSAGE (OUTPATIENT)
Dept: ENDOCRINOLOGY | Facility: CLINIC | Age: 25
End: 2025-07-28

## 2025-07-28 DIAGNOSIS — G62.9 NEUROPATHY: ICD-10-CM

## 2025-07-28 RX ORDER — DULOXETIN HYDROCHLORIDE 30 MG/1
30 CAPSULE, DELAYED RELEASE ORAL DAILY
Qty: 30 CAPSULE | Refills: 0 | Status: SHIPPED | OUTPATIENT
Start: 2025-07-28 | End: 2026-07-28

## 2025-08-11 ENCOUNTER — PATIENT MESSAGE (OUTPATIENT)
Dept: ENDOCRINOLOGY | Facility: CLINIC | Age: 25
End: 2025-08-11